# Patient Record
Sex: FEMALE | Race: WHITE | NOT HISPANIC OR LATINO | Employment: OTHER | URBAN - METROPOLITAN AREA
[De-identification: names, ages, dates, MRNs, and addresses within clinical notes are randomized per-mention and may not be internally consistent; named-entity substitution may affect disease eponyms.]

---

## 2023-02-14 PROBLEM — G47.30 APNEA, SLEEP: Status: ACTIVE | Noted: 2023-02-14

## 2023-02-14 PROBLEM — S82.409A TIBIA/FIBULA FRACTURE: Status: ACTIVE | Noted: 2023-02-14

## 2023-02-14 PROBLEM — R06.83 LOUD SNORING: Status: ACTIVE | Noted: 2023-02-14

## 2023-02-14 PROBLEM — S82.209A TIBIA/FIBULA FRACTURE: Status: ACTIVE | Noted: 2023-02-14

## 2023-02-14 PROBLEM — R51.9 HEADACHE: Status: ACTIVE | Noted: 2023-02-14

## 2023-02-14 PROBLEM — N95.1 MENOPAUSAL SYMPTOMS: Status: ACTIVE | Noted: 2023-02-14

## 2023-02-14 PROBLEM — R03.0 ELEVATED BLOOD PRESSURE READING: Status: ACTIVE | Noted: 2023-02-14

## 2023-02-14 PROBLEM — J44.9 COPD (CHRONIC OBSTRUCTIVE PULMONARY DISEASE) (MULTI): Status: ACTIVE | Noted: 2023-02-14

## 2023-02-14 PROBLEM — D21.10 BENIGN CONNECTIVE TISSUE NEOPLASM OF FINGER: Status: ACTIVE | Noted: 2023-02-14

## 2023-02-14 PROBLEM — B37.31 VAGINAL CANDIDIASIS: Status: ACTIVE | Noted: 2023-02-14

## 2023-02-14 PROBLEM — J22 LOWER RESPIRATORY TRACT INFECTION DUE TO COVID-19 VIRUS: Status: ACTIVE | Noted: 2023-02-14

## 2023-02-14 PROBLEM — T75.3XXA SEA SICKNESS: Status: ACTIVE | Noted: 2023-02-14

## 2023-02-14 PROBLEM — R11.0 NAUSEA IN ADULT: Status: ACTIVE | Noted: 2023-02-14

## 2023-02-14 PROBLEM — M79.643 HAND PAIN: Status: ACTIVE | Noted: 2023-02-14

## 2023-02-14 PROBLEM — H10.32 ACUTE BACTERIAL CONJUNCTIVITIS OF LEFT EYE: Status: ACTIVE | Noted: 2023-02-14

## 2023-02-14 PROBLEM — H44.002 INFECTION OF LEFT EYE: Status: ACTIVE | Noted: 2023-02-14

## 2023-02-14 PROBLEM — M65.341 TRIGGER RING FINGER OF RIGHT HAND: Status: ACTIVE | Noted: 2023-02-14

## 2023-02-14 PROBLEM — S00.93XA HEAD CONTUSION: Status: ACTIVE | Noted: 2023-02-14

## 2023-02-14 PROBLEM — K21.9 GERD (GASTROESOPHAGEAL REFLUX DISEASE): Status: ACTIVE | Noted: 2023-02-14

## 2023-02-14 PROBLEM — G47.00 INSOMNIA: Status: ACTIVE | Noted: 2023-02-14

## 2023-02-14 PROBLEM — R21 RASH: Status: ACTIVE | Noted: 2023-02-14

## 2023-02-14 PROBLEM — M25.539 PAIN, WRIST JOINT: Status: ACTIVE | Noted: 2023-02-14

## 2023-02-14 PROBLEM — G56.00 CARPAL TUNNEL SYNDROME: Status: ACTIVE | Noted: 2023-02-14

## 2023-02-14 PROBLEM — M79.606 LEG PAIN: Status: ACTIVE | Noted: 2023-02-14

## 2023-02-14 PROBLEM — H57.89 EYE DISCHARGE: Status: ACTIVE | Noted: 2023-02-14

## 2023-02-14 PROBLEM — R23.2 VASOMOTOR FLUSHING: Status: ACTIVE | Noted: 2023-02-14

## 2023-02-14 PROBLEM — R39.9 UTI SYMPTOMS: Status: ACTIVE | Noted: 2023-02-14

## 2023-02-14 PROBLEM — B35.1 ONYCHOMYCOSIS OF TOENAIL: Status: ACTIVE | Noted: 2023-02-14

## 2023-02-14 PROBLEM — U07.1 LOWER RESPIRATORY TRACT INFECTION DUE TO COVID-19 VIRUS: Status: ACTIVE | Noted: 2023-02-14

## 2023-02-14 PROBLEM — K62.5 RECTAL BLEED: Status: ACTIVE | Noted: 2023-02-14

## 2023-02-14 PROBLEM — L71.0 PERIORAL DERMATITIS: Status: ACTIVE | Noted: 2023-02-14

## 2023-02-14 PROBLEM — M67.432 GANGLION OF LEFT WRIST: Status: ACTIVE | Noted: 2023-02-14

## 2023-02-14 PROBLEM — J06.9 ACUTE UPPER RESPIRATORY INFECTION: Status: ACTIVE | Noted: 2023-02-14

## 2023-02-14 PROBLEM — J01.90 ACUTE SINUSITIS: Status: ACTIVE | Noted: 2023-02-14

## 2023-02-14 PROBLEM — K63.5 HYPERPLASTIC COLON POLYP: Status: ACTIVE | Noted: 2023-02-14

## 2023-02-14 PROBLEM — L73.9 FOLLICULITIS OF NOSE: Status: ACTIVE | Noted: 2023-02-14

## 2023-02-14 PROBLEM — J18.9 PNEUMONIA: Status: ACTIVE | Noted: 2023-02-14

## 2023-02-14 PROBLEM — B00.2 ORAL HERPES: Status: ACTIVE | Noted: 2023-02-14

## 2023-02-14 PROBLEM — N76.0 BACTERIAL VAGINOSIS: Status: ACTIVE | Noted: 2023-02-14

## 2023-02-14 PROBLEM — L72.3 SEBACEOUS CYST: Status: ACTIVE | Noted: 2023-02-14

## 2023-02-14 PROBLEM — B96.89 BACTERIAL VAGINOSIS: Status: ACTIVE | Noted: 2023-02-14

## 2023-02-14 PROBLEM — F41.9 ANXIETY: Status: ACTIVE | Noted: 2023-02-14

## 2023-02-14 PROBLEM — E78.5 HYPERLIPEMIA: Status: ACTIVE | Noted: 2023-02-14

## 2023-02-14 PROBLEM — M06.9 RHEUMATOID ARTHRITIS (MULTI): Status: ACTIVE | Noted: 2023-02-14

## 2023-02-14 PROBLEM — M54.2 NECK PAIN: Status: ACTIVE | Noted: 2023-02-14

## 2023-02-14 PROBLEM — L65.9 HAIR LOSS: Status: ACTIVE | Noted: 2023-02-14

## 2023-02-14 PROBLEM — R93.89 ABNORMAL CHEST CT: Status: ACTIVE | Noted: 2023-02-14

## 2023-02-14 PROBLEM — M85.80 OSTEOPENIA: Status: ACTIVE | Noted: 2023-02-14

## 2023-02-14 PROBLEM — R53.83 FATIGUE: Status: ACTIVE | Noted: 2023-02-14

## 2023-02-14 PROBLEM — M47.816 LUMBAR SPONDYLOSIS: Status: ACTIVE | Noted: 2023-02-14

## 2023-02-14 PROBLEM — M19.90 ARTHRITIS, DEGENERATIVE: Status: ACTIVE | Noted: 2023-02-14

## 2023-02-14 PROBLEM — R07.89 CHEST DISCOMFORT: Status: ACTIVE | Noted: 2023-02-14

## 2023-02-14 PROBLEM — M54.9 BACK PAIN: Status: ACTIVE | Noted: 2023-02-14

## 2023-02-14 PROBLEM — K64.4 EXTERNAL HEMORRHOIDS: Status: ACTIVE | Noted: 2023-02-14

## 2023-04-03 ENCOUNTER — APPOINTMENT (OUTPATIENT)
Dept: PRIMARY CARE | Facility: CLINIC | Age: 68
End: 2023-04-03
Payer: MEDICARE

## 2023-04-03 PROBLEM — Z04.9 CONDITION NOT FOUND: Status: ACTIVE | Noted: 2023-04-03

## 2023-04-03 RX ORDER — OMEPRAZOLE 40 MG/1
40 CAPSULE, DELAYED RELEASE ORAL DAILY
COMMUNITY
End: 2024-01-05 | Stop reason: ALTCHOICE

## 2023-05-05 ENCOUNTER — APPOINTMENT (OUTPATIENT)
Dept: PRIMARY CARE | Facility: CLINIC | Age: 68
End: 2023-05-05
Payer: MEDICARE

## 2023-05-26 ENCOUNTER — OFFICE VISIT (OUTPATIENT)
Dept: PRIMARY CARE | Facility: CLINIC | Age: 68
End: 2023-05-26
Payer: MEDICARE

## 2023-05-26 VITALS
WEIGHT: 157.4 LBS | HEART RATE: 79 BPM | HEIGHT: 67 IN | TEMPERATURE: 97.2 F | SYSTOLIC BLOOD PRESSURE: 151 MMHG | BODY MASS INDEX: 24.71 KG/M2 | DIASTOLIC BLOOD PRESSURE: 94 MMHG | OXYGEN SATURATION: 94 %

## 2023-05-26 DIAGNOSIS — I45.10 INCOMPLETE RBBB: ICD-10-CM

## 2023-05-26 DIAGNOSIS — Z12.31 BREAST CANCER SCREENING BY MAMMOGRAM: ICD-10-CM

## 2023-05-26 DIAGNOSIS — I10 PRIMARY HYPERTENSION: Primary | ICD-10-CM

## 2023-05-26 DIAGNOSIS — J42 CHRONIC BRONCHITIS, UNSPECIFIED CHRONIC BRONCHITIS TYPE (MULTI): ICD-10-CM

## 2023-05-26 DIAGNOSIS — K21.9 GASTROESOPHAGEAL REFLUX DISEASE WITHOUT ESOPHAGITIS: ICD-10-CM

## 2023-05-26 DIAGNOSIS — R03.0 ELEVATED BLOOD PRESSURE READING: ICD-10-CM

## 2023-05-26 DIAGNOSIS — E78.49 OTHER HYPERLIPIDEMIA: ICD-10-CM

## 2023-05-26 DIAGNOSIS — M06.9 RHEUMATOID ARTHRITIS, INVOLVING UNSPECIFIED SITE, UNSPECIFIED WHETHER RHEUMATOID FACTOR PRESENT (MULTI): ICD-10-CM

## 2023-05-26 DIAGNOSIS — G47.09 OTHER INSOMNIA: ICD-10-CM

## 2023-05-26 DIAGNOSIS — R93.89 ABNORMAL CHEST CT: ICD-10-CM

## 2023-05-26 PROBLEM — J43.9 EMPHYSEMA LUNG (MULTI): Status: ACTIVE | Noted: 2023-05-26

## 2023-05-26 PROBLEM — E78.00 HYPERCHOLESTEROLEMIA: Status: ACTIVE | Noted: 2022-12-19

## 2023-05-26 PROBLEM — R94.31 ABNORMAL EKG: Status: ACTIVE | Noted: 2022-12-19

## 2023-05-26 PROBLEM — R53.83 FATIGUE: Status: RESOLVED | Noted: 2023-02-14 | Resolved: 2023-05-26

## 2023-05-26 PROCEDURE — 1160F RVW MEDS BY RX/DR IN RCRD: CPT | Performed by: INTERNAL MEDICINE

## 2023-05-26 PROCEDURE — 99214 OFFICE O/P EST MOD 30 MIN: CPT | Performed by: INTERNAL MEDICINE

## 2023-05-26 PROCEDURE — 3077F SYST BP >= 140 MM HG: CPT | Performed by: INTERNAL MEDICINE

## 2023-05-26 PROCEDURE — 1036F TOBACCO NON-USER: CPT | Performed by: INTERNAL MEDICINE

## 2023-05-26 PROCEDURE — 1159F MED LIST DOCD IN RCRD: CPT | Performed by: INTERNAL MEDICINE

## 2023-05-26 PROCEDURE — 3080F DIAST BP >= 90 MM HG: CPT | Performed by: INTERNAL MEDICINE

## 2023-05-26 RX ORDER — LOSARTAN POTASSIUM 50 MG/1
50 TABLET ORAL DAILY
Qty: 30 TABLET | Refills: 2 | Status: SHIPPED | OUTPATIENT
Start: 2023-05-26 | End: 2023-06-19

## 2023-05-26 RX ORDER — TRAZODONE HYDROCHLORIDE 50 MG/1
50 TABLET ORAL NIGHTLY PRN
Qty: 30 TABLET | Refills: 2 | Status: SHIPPED | OUTPATIENT
Start: 2023-05-26 | End: 2023-06-19

## 2023-05-26 RX ORDER — ALBUTEROL SULFATE 90 UG/1
1 AEROSOL, METERED RESPIRATORY (INHALATION) EVERY 4 HOURS PRN
Qty: 18 G | Refills: 2 | Status: SHIPPED | OUTPATIENT
Start: 2023-05-26

## 2023-05-26 RX ORDER — ROSUVASTATIN CALCIUM 10 MG/1
10 TABLET, COATED ORAL DAILY
COMMUNITY
Start: 2023-03-19

## 2023-05-26 RX ORDER — ALBUTEROL SULFATE 90 UG/1
1 AEROSOL, METERED RESPIRATORY (INHALATION) EVERY 4 HOURS PRN
COMMUNITY
End: 2023-05-26 | Stop reason: SDUPTHER

## 2023-05-26 ASSESSMENT — PATIENT HEALTH QUESTIONNAIRE - PHQ9
2. FEELING DOWN, DEPRESSED OR HOPELESS: NOT AT ALL
1. LITTLE INTEREST OR PLEASURE IN DOING THINGS: NOT AT ALL
SUM OF ALL RESPONSES TO PHQ9 QUESTIONS 1 AND 2: 0

## 2023-05-26 ASSESSMENT — ENCOUNTER SYMPTOMS
PSYCHIATRIC NEGATIVE: 1
EYES NEGATIVE: 1
HEMATOLOGIC/LYMPHATIC NEGATIVE: 1
RESPIRATORY NEGATIVE: 1
NEUROLOGICAL NEGATIVE: 1
GASTROINTESTINAL NEGATIVE: 1
CONSTITUTIONAL NEGATIVE: 1
CARDIOVASCULAR NEGATIVE: 1

## 2023-05-26 NOTE — PATIENT INSTRUCTIONS
Start Losartan.  Cut back on caffeine intake.  Return as nurse visit for Prevnar 20.  Follow up in 4-6 weeks.  Renew all meds.

## 2023-05-26 NOTE — ASSESSMENT & PLAN NOTE
Start Losartan,monitor home BP and bring BP log and BP monitor for validation at next visit in 4-6 weeks.follow strict low sodium diet and avoid caffeine.

## 2023-05-26 NOTE — PROGRESS NOTES
"Subjective   Patient ID: Yu Oneil is a 67 y.o. female who presents for 3 month follow up.     f/u on HLD,COPD,insomnia,tolerating current meds well,she feels well and denies any complaints except for her hands pain from her RA, they had to cut her ring due to her finger swelling.has infrequent brief dry cough.  she continue to see Dr Awan for her RA.  Follow up on HTN,she drinks 4 cups of coffee,her home BP has been up.  No CP,SOB.  She started walking and swimming to make her legs muscle stronger.  she rarely needs to use Trazadone for insomnia,but needs a refill.  she was a former smoker 1 PPD or more for over 30 years,quit in 2019,last CT chest low dose done 12/22.            Review of Systems   Constitutional: Negative.    HENT: Negative.     Eyes: Negative.    Respiratory: Negative.          As HPI.   Cardiovascular: Negative.    Gastrointestinal: Negative.    Genitourinary: Negative.    Musculoskeletal:         Has RA and hands pain.   Neurological: Negative.    Hematological: Negative.    Psychiatric/Behavioral: Negative.         Objective   BP (!) 151/94 (BP Location: Right arm, Patient Position: Sitting, BP Cuff Size: Adult)   Pulse 79   Temp 36.2 °C (97.2 °F) (Temporal)   Ht 1.702 m (5' 7\")   Wt 71.4 kg (157 lb 6.4 oz)   SpO2 94%   BMI 24.65 kg/m²     Physical Exam  Constitutional:       Appearance: Normal appearance.   HENT:      Head: Normocephalic and atraumatic.   Eyes:      Extraocular Movements: Extraocular movements intact.      Pupils: Pupils are equal, round, and reactive to light.   Cardiovascular:      Rate and Rhythm: Normal rate and regular rhythm.      Heart sounds: Normal heart sounds.   Pulmonary:      Effort: Pulmonary effort is normal.      Breath sounds: Normal breath sounds. No wheezing or rhonchi.   Abdominal:      General: Abdomen is flat. Bowel sounds are normal. There is no distension.      Palpations: Abdomen is soft.   Musculoskeletal:      Cervical back: Normal " range of motion and neck supple.      Left lower leg: No edema.      Comments: Hands RA changes.   Skin:     General: Skin is warm.   Neurological:      General: No focal deficit present.      Mental Status: She is alert and oriented to person, place, and time.   Psychiatric:         Mood and Affect: Mood normal.         Behavior: Behavior normal.         Assessment/Plan   Problem List Items Addressed This Visit          Nervous    Insomnia    Relevant Medications    traZODone (Desyrel) 50 mg tablet       Respiratory    COPD (chronic obstructive pulmonary disease) (CMS/HCC)    Relevant Medications    albuterol 90 mcg/actuation inhaler       Circulatory    Elevated blood pressure reading     Start Losartan,monitor home BP and bring BP log and BP monitor for validation at next visit in 4-6 weeks.follow strict low sodium diet and avoid caffeine.         Incomplete RBBB    Primary hypertension - Primary    Relevant Medications    losartan (Cozaar) 50 mg tablet       Digestive    GERD (gastroesophageal reflux disease)       Other    Abnormal chest CT    Hyperlipemia     Stable on crestor,order fasting lipid.         Relevant Orders    Lipid panel    Hepatic Function Panel    Rheumatoid arthritis (CMS/[facility])     Other Visit Diagnoses       Breast cancer screening by mammogram        Relevant Orders    BI mammo bilateral screening tomosynthesis

## 2023-06-02 ENCOUNTER — APPOINTMENT (OUTPATIENT)
Dept: PRIMARY CARE | Facility: CLINIC | Age: 68
End: 2023-06-02
Payer: MEDICARE

## 2023-06-05 ENCOUNTER — APPOINTMENT (OUTPATIENT)
Dept: PRIMARY CARE | Facility: CLINIC | Age: 68
End: 2023-06-05
Payer: MEDICARE

## 2023-06-07 ENCOUNTER — CLINICAL SUPPORT (OUTPATIENT)
Dept: PRIMARY CARE | Facility: CLINIC | Age: 68
End: 2023-06-07
Payer: MEDICARE

## 2023-06-07 ENCOUNTER — LAB (OUTPATIENT)
Dept: LAB | Facility: LAB | Age: 68
End: 2023-06-07
Payer: MEDICARE

## 2023-06-07 DIAGNOSIS — Z00.00 HEALTHCARE MAINTENANCE: ICD-10-CM

## 2023-06-07 DIAGNOSIS — K21.9 GASTROESOPHAGEAL REFLUX DISEASE, UNSPECIFIED WHETHER ESOPHAGITIS PRESENT: ICD-10-CM

## 2023-06-07 DIAGNOSIS — E78.49 OTHER HYPERLIPIDEMIA: ICD-10-CM

## 2023-06-07 LAB
ALANINE AMINOTRANSFERASE (SGPT) (U/L) IN SER/PLAS: 23 U/L (ref 7–45)
ALBUMIN (G/DL) IN SER/PLAS: 4.1 G/DL (ref 3.4–5)
ALKALINE PHOSPHATASE (U/L) IN SER/PLAS: 73 U/L (ref 33–136)
ASPARTATE AMINOTRANSFERASE (SGOT) (U/L) IN SER/PLAS: 26 U/L (ref 9–39)
BILIRUBIN DIRECT (MG/DL) IN SER/PLAS: 0.2 MG/DL (ref 0–0.3)
BILIRUBIN TOTAL (MG/DL) IN SER/PLAS: 0.8 MG/DL (ref 0–1.2)
CHOLESTEROL (MG/DL) IN SER/PLAS: 214 MG/DL (ref 0–199)
CHOLESTEROL IN HDL (MG/DL) IN SER/PLAS: 95.2 MG/DL
CHOLESTEROL/HDL RATIO: 2.2
LDL: 109 MG/DL (ref 0–99)
PROTEIN TOTAL: 7.7 G/DL (ref 6.4–8.2)
TRIGLYCERIDE (MG/DL) IN SER/PLAS: 47 MG/DL (ref 0–149)
VLDL: 9 MG/DL (ref 0–40)

## 2023-06-07 PROCEDURE — 80076 HEPATIC FUNCTION PANEL: CPT

## 2023-06-07 PROCEDURE — 80061 LIPID PANEL: CPT

## 2023-06-07 PROCEDURE — G0009 ADMIN PNEUMOCOCCAL VACCINE: HCPCS | Performed by: INTERNAL MEDICINE

## 2023-06-07 PROCEDURE — 36415 COLL VENOUS BLD VENIPUNCTURE: CPT

## 2023-06-07 PROCEDURE — 90677 PCV20 VACCINE IM: CPT | Performed by: INTERNAL MEDICINE

## 2023-06-07 RX ORDER — FAMOTIDINE 20 MG/1
20 TABLET, FILM COATED ORAL 2 TIMES DAILY PRN
Qty: 180 TABLET | Refills: 3 | Status: SHIPPED | OUTPATIENT
Start: 2023-06-07

## 2023-06-07 RX ORDER — FAMOTIDINE 20 MG/1
20 TABLET, FILM COATED ORAL 2 TIMES DAILY PRN
COMMUNITY
End: 2023-06-07 | Stop reason: SDUPTHER

## 2023-06-09 PROBLEM — Z00.00 HEALTHCARE MAINTENANCE: Status: ACTIVE | Noted: 2023-04-03

## 2023-06-17 DIAGNOSIS — I10 PRIMARY HYPERTENSION: ICD-10-CM

## 2023-06-17 DIAGNOSIS — G47.09 OTHER INSOMNIA: ICD-10-CM

## 2023-06-19 RX ORDER — TRAZODONE HYDROCHLORIDE 50 MG/1
50 TABLET ORAL NIGHTLY PRN
Qty: 30 TABLET | Refills: 2 | Status: SHIPPED | OUTPATIENT
Start: 2023-06-19 | End: 2023-09-19

## 2023-06-19 RX ORDER — LOSARTAN POTASSIUM 50 MG/1
TABLET ORAL
Qty: 30 TABLET | Refills: 2 | Status: SHIPPED | OUTPATIENT
Start: 2023-06-19 | End: 2023-07-03

## 2023-06-26 ENCOUNTER — APPOINTMENT (OUTPATIENT)
Dept: PRIMARY CARE | Facility: CLINIC | Age: 68
End: 2023-06-26
Payer: MEDICARE

## 2023-07-03 DIAGNOSIS — I10 PRIMARY HYPERTENSION: ICD-10-CM

## 2023-07-03 RX ORDER — LOSARTAN POTASSIUM 50 MG/1
TABLET ORAL
Qty: 90 TABLET | Refills: 3 | Status: SHIPPED | OUTPATIENT
Start: 2023-07-03 | End: 2024-01-05

## 2023-07-06 PROBLEM — R53.83 FATIGUE: Status: ACTIVE | Noted: 2023-07-06

## 2023-07-06 PROBLEM — R07.89 CHEST TIGHTNESS: Status: ACTIVE | Noted: 2023-07-06

## 2023-07-06 PROBLEM — E78.5 HYPERLIPIDEMIA: Status: ACTIVE | Noted: 2023-07-06

## 2023-07-06 PROBLEM — M54.2 CERVICAL PAIN: Status: ACTIVE | Noted: 2023-07-06

## 2023-07-06 PROBLEM — S39.012A LUMBAR STRAIN: Status: ACTIVE | Noted: 2023-07-06

## 2023-07-06 PROBLEM — I10 ESSENTIAL HYPERTENSION, BENIGN: Status: ACTIVE | Noted: 2023-07-06

## 2023-07-07 ENCOUNTER — APPOINTMENT (OUTPATIENT)
Dept: PRIMARY CARE | Facility: CLINIC | Age: 68
End: 2023-07-07
Payer: MEDICARE

## 2023-07-14 ENCOUNTER — APPOINTMENT (OUTPATIENT)
Dept: PRIMARY CARE | Facility: CLINIC | Age: 68
End: 2023-07-14
Payer: MEDICARE

## 2023-08-21 ENCOUNTER — OFFICE VISIT (OUTPATIENT)
Dept: PRIMARY CARE | Facility: CLINIC | Age: 68
End: 2023-08-21
Payer: MEDICARE

## 2023-08-21 VITALS
HEIGHT: 67 IN | HEART RATE: 82 BPM | BODY MASS INDEX: 24.61 KG/M2 | WEIGHT: 156.8 LBS | SYSTOLIC BLOOD PRESSURE: 135 MMHG | TEMPERATURE: 97 F | DIASTOLIC BLOOD PRESSURE: 83 MMHG | OXYGEN SATURATION: 96 %

## 2023-08-21 DIAGNOSIS — J20.9 ACUTE BRONCHITIS, UNSPECIFIED ORGANISM: Primary | ICD-10-CM

## 2023-08-21 DIAGNOSIS — J45.909 REACTIVE AIRWAY DISEASE WITH WHEEZING WITHOUT COMPLICATION, UNSPECIFIED ASTHMA SEVERITY, UNSPECIFIED WHETHER PERSISTENT (HHS-HCC): ICD-10-CM

## 2023-08-21 PROCEDURE — 1160F RVW MEDS BY RX/DR IN RCRD: CPT | Performed by: STUDENT IN AN ORGANIZED HEALTH CARE EDUCATION/TRAINING PROGRAM

## 2023-08-21 PROCEDURE — 99213 OFFICE O/P EST LOW 20 MIN: CPT | Performed by: STUDENT IN AN ORGANIZED HEALTH CARE EDUCATION/TRAINING PROGRAM

## 2023-08-21 PROCEDURE — 3075F SYST BP GE 130 - 139MM HG: CPT | Performed by: STUDENT IN AN ORGANIZED HEALTH CARE EDUCATION/TRAINING PROGRAM

## 2023-08-21 PROCEDURE — 3079F DIAST BP 80-89 MM HG: CPT | Performed by: STUDENT IN AN ORGANIZED HEALTH CARE EDUCATION/TRAINING PROGRAM

## 2023-08-21 PROCEDURE — 1126F AMNT PAIN NOTED NONE PRSNT: CPT | Performed by: STUDENT IN AN ORGANIZED HEALTH CARE EDUCATION/TRAINING PROGRAM

## 2023-08-21 PROCEDURE — 1159F MED LIST DOCD IN RCRD: CPT | Performed by: STUDENT IN AN ORGANIZED HEALTH CARE EDUCATION/TRAINING PROGRAM

## 2023-08-21 PROCEDURE — 1036F TOBACCO NON-USER: CPT | Performed by: STUDENT IN AN ORGANIZED HEALTH CARE EDUCATION/TRAINING PROGRAM

## 2023-08-21 RX ORDER — PREDNISONE 20 MG/1
40 TABLET ORAL DAILY
Qty: 10 TABLET | Refills: 0 | Status: SHIPPED | OUTPATIENT
Start: 2023-08-21 | End: 2023-08-26

## 2023-08-21 ASSESSMENT — ENCOUNTER SYMPTOMS
TROUBLE SWALLOWING: 1
FATIGUE: 1
COUGH: 1
FEVER: 0
LIGHT-HEADEDNESS: 1
SORE THROAT: 1
CHEST TIGHTNESS: 1
CHILLS: 0
SHORTNESS OF BREATH: 1
DIAPHORESIS: 1
ABDOMINAL PAIN: 0
SINUS PAIN: 0
NAUSEA: 0
RHINORRHEA: 1
SINUS PRESSURE: 0
VOMITING: 1

## 2023-08-21 ASSESSMENT — PATIENT HEALTH QUESTIONNAIRE - PHQ9
2. FEELING DOWN, DEPRESSED OR HOPELESS: NOT AT ALL
SUM OF ALL RESPONSES TO PHQ9 QUESTIONS 1 AND 2: 0
1. LITTLE INTEREST OR PLEASURE IN DOING THINGS: NOT AT ALL

## 2023-08-21 NOTE — PROGRESS NOTES
"Subjective   Patient ID: Yu Oneil is a 67 y.o. female who presents for Cough (COVID NEGATIVE).  She is here for URI. Did 2 covid tests at home and were negative. Hasn't been able to sleep over last 3 nights to help with mucus drainage. Coughing so hard feels she has to vomit at times. 4 days ago she began with a sore throat, developed into cough, fatigue. No fevers. Tried zycam, mucinex spray (benzocaine and menthol).         Review of Systems   Constitutional:  Positive for diaphoresis and fatigue. Negative for chills and fever.   HENT:  Positive for postnasal drip, rhinorrhea, sore throat and trouble swallowing (improving, able to keep food and liquids down.). Negative for ear discharge, ear pain, sinus pressure and sinus pain.    Respiratory:  Positive for cough, chest tightness (with coughing) and shortness of breath (intermittent and a/w SOB).    Cardiovascular:  Negative for chest pain.   Gastrointestinal:  Positive for vomiting (x1 episode a/w vomiting). Negative for abdominal pain and nausea.   Neurological:  Positive for light-headedness (if coughes a lot will get this).       /83   Pulse 82   Temp 36.1 °C (97 °F)   Ht 1.702 m (5' 7\")   Wt 71.1 kg (156 lb 12.8 oz)   SpO2 96%   BMI 24.56 kg/m²     Objective   Physical Exam  Vitals reviewed.   HENT:      Head: Normocephalic.      Right Ear: Tympanic membrane, ear canal and external ear normal. There is no impacted cerumen.      Left Ear: Tympanic membrane, ear canal and external ear normal. There is no impacted cerumen.      Mouth/Throat:      Mouth: Mucous membranes are moist.      Pharynx: Oropharynx is clear. No oropharyngeal exudate or posterior oropharyngeal erythema.   Cardiovascular:      Rate and Rhythm: Normal rate and regular rhythm.   Pulmonary:      Effort: Pulmonary effort is normal. No respiratory distress.      Breath sounds: Wheezing (Diffuse, more prominent in upper lobes) present. No rhonchi or rales.   Abdominal:      " General: There is no distension.   Musculoskeletal:         General: No deformity.      Cervical back: Neck supple. No tenderness.   Lymphadenopathy:      Cervical: No cervical adenopathy.   Skin:     Coloration: Skin is not jaundiced.   Neurological:      Mental Status: She is alert.   Psychiatric:         Mood and Affect: Mood normal.         Behavior: Behavior normal.         Assessment/Plan   Problem List Items Addressed This Visit       Reactive airway disease with wheezing without complication     Other Visit Diagnoses       Acute bronchitis, unspecified organism    -  Primary    Relevant Medications    predniSONE (Deltasone) 20 mg tablet        Acute viral bronchitis  Reactive airway disease  - Symptoms seem consistent with viral bronchitis/reactive airway disease.  -Trial prednisone burst and albuterol every 4 hours 1 to 2 puffs as needed for shortness of breath.  Advised take prednisone early in the morning with food to avoid insomnia.  -She will use mucinex spray PRN for sore throat. Also consider salt water gargles.   -Monitor pulse ox at home and ER for pulse ox less than 90%..  Fluids and rest.  -She will let us know how she is doing over the next few days.  Low threshold for x-ray/antibiotics.  ER for worsening symptoms.    Follow-up with me as needed  Follow-up with Dr. Bowens as previously scheduled.

## 2023-08-21 NOTE — LETTER
August 21, 2023     Patient: Yu Oneil   YOB: 1955   Date of Visit: 8/21/2023       To Whom It May Concern:    Yu Oneil was seen in my clinic on 8/21/2023 at 2:00 pm. She is recovering from a viral upper respiratory illness and needs to recover as she is high risk of going to the emergency room. She should not be around young children while she recovers over this week. I do not recommend she babysit her grandchild Camilo Oneil.     If you have any questions or concerns, please don't hesitate to call.         Sincerely,         Roberto Andres, DO        CC: No Recipients

## 2023-09-17 DIAGNOSIS — G47.09 OTHER INSOMNIA: ICD-10-CM

## 2023-09-19 RX ORDER — TRAZODONE HYDROCHLORIDE 50 MG/1
50 TABLET ORAL NIGHTLY PRN
Qty: 90 TABLET | Refills: 0 | Status: SHIPPED | OUTPATIENT
Start: 2023-09-19

## 2023-10-19 ENCOUNTER — SPECIALTY PHARMACY (OUTPATIENT)
Dept: PHARMACY | Facility: CLINIC | Age: 68
End: 2023-10-19

## 2023-10-19 ENCOUNTER — PHARMACY VISIT (OUTPATIENT)
Dept: PHARMACY | Facility: CLINIC | Age: 68
End: 2023-10-19
Payer: COMMERCIAL

## 2023-10-19 PROCEDURE — RXMED WILLOW AMBULATORY MEDICATION CHARGE

## 2023-11-08 ENCOUNTER — OFFICE VISIT (OUTPATIENT)
Dept: PRIMARY CARE | Facility: CLINIC | Age: 68
End: 2023-11-08
Payer: MEDICARE

## 2023-11-08 VITALS
WEIGHT: 161 LBS | DIASTOLIC BLOOD PRESSURE: 80 MMHG | SYSTOLIC BLOOD PRESSURE: 138 MMHG | TEMPERATURE: 97.2 F | HEART RATE: 60 BPM | BODY MASS INDEX: 25.27 KG/M2 | OXYGEN SATURATION: 98 % | HEIGHT: 67 IN

## 2023-11-08 DIAGNOSIS — I70.0 ATHEROSCLEROSIS OF AORTA (CMS-HCC): ICD-10-CM

## 2023-11-08 DIAGNOSIS — G89.29 CHRONIC PAIN OF BOTH FEET: ICD-10-CM

## 2023-11-08 DIAGNOSIS — Z12.31 VISIT FOR SCREENING MAMMOGRAM: ICD-10-CM

## 2023-11-08 DIAGNOSIS — I10 ESSENTIAL HYPERTENSION, BENIGN: ICD-10-CM

## 2023-11-08 DIAGNOSIS — M79.671 CHRONIC PAIN OF BOTH FEET: ICD-10-CM

## 2023-11-08 DIAGNOSIS — M79.672 CHRONIC PAIN OF BOTH FEET: ICD-10-CM

## 2023-11-08 DIAGNOSIS — E27.8 OTHER SPECIFIED DISORDERS OF ADRENAL GLAND (MULTI): ICD-10-CM

## 2023-11-08 DIAGNOSIS — R25.2 CRAMP IN LOWER LEG: ICD-10-CM

## 2023-11-08 DIAGNOSIS — B35.1 ONYCHOMYCOSIS: Primary | ICD-10-CM

## 2023-11-08 DIAGNOSIS — Z00.00 HEALTHCARE MAINTENANCE: ICD-10-CM

## 2023-11-08 DIAGNOSIS — E78.00 HYPERCHOLESTEROLEMIA: ICD-10-CM

## 2023-11-08 PROBLEM — E78.5 HYPERLIPEMIA: Status: RESOLVED | Noted: 2023-02-14 | Resolved: 2023-11-08

## 2023-11-08 PROBLEM — E78.5 HYPERLIPIDEMIA: Status: RESOLVED | Noted: 2023-07-06 | Resolved: 2023-11-08

## 2023-11-08 PROCEDURE — 1160F RVW MEDS BY RX/DR IN RCRD: CPT | Performed by: INTERNAL MEDICINE

## 2023-11-08 PROCEDURE — 3075F SYST BP GE 130 - 139MM HG: CPT | Performed by: INTERNAL MEDICINE

## 2023-11-08 PROCEDURE — 1159F MED LIST DOCD IN RCRD: CPT | Performed by: INTERNAL MEDICINE

## 2023-11-08 PROCEDURE — 90662 IIV NO PRSV INCREASED AG IM: CPT | Performed by: INTERNAL MEDICINE

## 2023-11-08 PROCEDURE — G0008 ADMIN INFLUENZA VIRUS VAC: HCPCS | Performed by: INTERNAL MEDICINE

## 2023-11-08 PROCEDURE — 1036F TOBACCO NON-USER: CPT | Performed by: INTERNAL MEDICINE

## 2023-11-08 PROCEDURE — 99214 OFFICE O/P EST MOD 30 MIN: CPT | Performed by: INTERNAL MEDICINE

## 2023-11-08 PROCEDURE — 3078F DIAST BP <80 MM HG: CPT | Performed by: INTERNAL MEDICINE

## 2023-11-08 PROCEDURE — 1126F AMNT PAIN NOTED NONE PRSNT: CPT | Performed by: INTERNAL MEDICINE

## 2023-11-08 RX ORDER — TERBINAFINE HYDROCHLORIDE 250 MG/1
250 TABLET ORAL 2 TIMES DAILY
Qty: 180 TABLET | Refills: 0 | Status: SHIPPED | OUTPATIENT
Start: 2023-11-08 | End: 2024-02-06

## 2023-11-08 RX ORDER — METHOCARBAMOL 750 MG/1
TABLET, FILM COATED ORAL
COMMUNITY
Start: 2023-08-28

## 2023-11-08 ASSESSMENT — ENCOUNTER SYMPTOMS
NEUROLOGICAL NEGATIVE: 1
EYES NEGATIVE: 1
GASTROINTESTINAL NEGATIVE: 1
PSYCHIATRIC NEGATIVE: 1
CARDIOVASCULAR NEGATIVE: 1
HEMATOLOGIC/LYMPHATIC NEGATIVE: 1
CONSTITUTIONAL NEGATIVE: 1

## 2023-11-08 ASSESSMENT — PATIENT HEALTH QUESTIONNAIRE - PHQ9
1. LITTLE INTEREST OR PLEASURE IN DOING THINGS: NOT AT ALL
2. FEELING DOWN, DEPRESSED OR HOPELESS: NOT AT ALL
SUM OF ALL RESPONSES TO PHQ9 QUESTIONS 1 AND 2: 0

## 2023-11-08 NOTE — ASSESSMENT & PLAN NOTE
Has been taking half losartan advised her to take a full 1 monitor her BP and return back for Medicare in January.  Bring home BP log with you at next visit.  Flu vaccine given to patient today.  I advised patient to schedule her mammogram.  Advised patient to get COVID booster and RSV vaccine at the pharmacy.

## 2023-11-08 NOTE — ASSESSMENT & PLAN NOTE
Order feet x-ray.  Refer to podiatrist, this also could be caused by her high arched, also could be radicular from her back.

## 2023-11-08 NOTE — ASSESSMENT & PLAN NOTE
We will treat with Lamisil for 3 months she had normal baseline liver test from December 2022 but will repeat CMP prior to starting Lamisil, we will also refer to a podiatry.

## 2023-11-08 NOTE — PROGRESS NOTES
"Subjective   Patient ID: Yu Oneil is a 68 y.o. female who presents for Feet pain/needle sensation  and Infected nail on left foot .    This is a 68-year-old patient who is here for problem with her feet and left great toenail.  She has been having bilateral feet pain specially when she is on her feet get better when she is lying down pain located at the distal plantar aspect of both feet.  She also had thickened mycotic left great toenail for years, in 21 she was treated with Lamisil with good response but then problem reoccurred.  She did hit her left great toe about a year ago and was turning black after the trauma, no real pain but the toenail changes worsened since then.  It is hard for her to cut her toenails.  She also noted cramping of her toes bilaterally, very painful, she tried to stretch as much as possible, she drink enough water.  She did not go for her mammogram yet.    f/u on HLD, hypertension.  she continue to see Dr Awan for her RA.  she rarely needs to use Trazadone for insomnia.  she was a former smoker 1 PPD or more for over 30 years,quit in 2019,last CT chest low dose done 12/17/21.            Review of Systems   Constitutional: Negative.    HENT: Negative.     Eyes: Negative.    Respiratory:          Has occasional dry cough.   Cardiovascular: Negative.    Gastrointestinal: Negative.    Genitourinary: Negative.    Musculoskeletal:         As HPI.   Neurological: Negative.    Hematological: Negative.    Psychiatric/Behavioral: Negative.         Objective   /80 (BP Location: Left arm, Patient Position: Sitting)   Pulse 60   Temp 36.2 °C (97.2 °F) (Temporal)   Ht 1.702 m (5' 7\")   Wt 73 kg (161 lb)   SpO2 98%   BMI 25.22 kg/m²     Physical Exam  Constitutional:       Appearance: Normal appearance.   HENT:      Head: Normocephalic and atraumatic.   Eyes:      Extraocular Movements: Extraocular movements intact.      Pupils: Pupils are equal, round, and reactive to light. "   Cardiovascular:      Rate and Rhythm: Normal rate and regular rhythm.      Heart sounds: Normal heart sounds.   Pulmonary:      Effort: Pulmonary effort is normal.      Breath sounds: Normal breath sounds. No wheezing or rhonchi.   Abdominal:      General: Abdomen is flat. There is no distension.   Musculoskeletal:      Cervical back: Normal range of motion and neck supple.      Right lower leg: No edema.      Left lower leg: No edema.      Comments: Feet noted high arch bilaterally.  Pain with palpation over the plantar aspect of her metatarsal joint on both feet.  Left great toenail mycotic, thickened yellowish, no pain when pressing on her toe or around her toenail,  She has good pedal pulse.   Skin:     General: Skin is warm.   Neurological:      General: No focal deficit present.      Mental Status: She is alert and oriented to person, place, and time.   Psychiatric:         Mood and Affect: Mood normal.         Behavior: Behavior normal.         Assessment/Plan   Problem List Items Addressed This Visit             ICD-10-CM    Onychomycosis - Primary B35.1     We will treat with Lamisil for 3 months she had normal baseline liver test from December 2022 but will repeat CMP prior to starting Lamisil, we will also refer to a podiatry.         Relevant Medications    terbinafine (LamISIL) 250 mg tablet    Other Relevant Orders    Referral to Podiatry    Healthcare maintenance Z00.00    Relevant Orders    Flu vaccine, quadrivalent, high-dose, preservative free, age 65y+ (FLUZONE) (Completed)    Hypercholesterolemia E78.00     Continue Crestor and low-fat diet.         Essential hypertension, benign I10     Has been taking half losartan advised her to take a full 1 monitor her BP and return back for Medicare in January.  Bring home BP log with you at next visit.  Flu vaccine given to patient today.  I advised patient to schedule her mammogram.  Advised patient to get COVID booster and RSV vaccine at the  pharmacy.         Chronic pain of both feet M79.671, G89.29, M79.672     Order feet x-ray.  Refer to podiatrist, this also could be caused by her high arched, also could be radicular from her back.         Relevant Orders    Referral to Podiatry    XR foot right 3+ views    XR foot left 3+ views    Cramp in lower leg R25.2     Check electrolytes and magnesium.  Stretching exercise.  Continue muscle relaxant.         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

## 2023-11-15 ENCOUNTER — PHARMACY VISIT (OUTPATIENT)
Dept: PHARMACY | Facility: CLINIC | Age: 68
End: 2023-11-15
Payer: COMMERCIAL

## 2023-11-15 PROCEDURE — RXMED WILLOW AMBULATORY MEDICATION CHARGE

## 2023-12-13 PROCEDURE — RXMED WILLOW AMBULATORY MEDICATION CHARGE

## 2023-12-18 ENCOUNTER — PHARMACY VISIT (OUTPATIENT)
Dept: PHARMACY | Facility: CLINIC | Age: 68
End: 2023-12-18
Payer: COMMERCIAL

## 2024-01-05 ENCOUNTER — OFFICE VISIT (OUTPATIENT)
Dept: PRIMARY CARE | Facility: CLINIC | Age: 69
End: 2024-01-05
Payer: MEDICARE

## 2024-01-05 VITALS
SYSTOLIC BLOOD PRESSURE: 147 MMHG | BODY MASS INDEX: 24.86 KG/M2 | DIASTOLIC BLOOD PRESSURE: 76 MMHG | HEIGHT: 67 IN | WEIGHT: 158.4 LBS | HEART RATE: 65 BPM | OXYGEN SATURATION: 98 %

## 2024-01-05 DIAGNOSIS — T75.3XXA SEA SICKNESS, INITIAL ENCOUNTER: ICD-10-CM

## 2024-01-05 DIAGNOSIS — E27.8 OTHER SPECIFIED DISORDERS OF ADRENAL GLAND (MULTI): ICD-10-CM

## 2024-01-05 DIAGNOSIS — Z11.3 SCREEN FOR STD (SEXUALLY TRANSMITTED DISEASE): ICD-10-CM

## 2024-01-05 DIAGNOSIS — Z12.31 VISIT FOR SCREENING MAMMOGRAM: ICD-10-CM

## 2024-01-05 DIAGNOSIS — I10 PRIMARY HYPERTENSION: ICD-10-CM

## 2024-01-05 DIAGNOSIS — Z12.2 ENCOUNTER FOR SCREENING FOR MALIGNANT NEOPLASM OF LUNG IN FORMER SMOKER WHO QUIT IN PAST 15 YEARS WITH 30 PACK YEAR HISTORY OR GREATER: ICD-10-CM

## 2024-01-05 DIAGNOSIS — K63.5 HYPERPLASTIC COLONIC POLYP, UNSPECIFIED PART OF COLON: ICD-10-CM

## 2024-01-05 DIAGNOSIS — Z00.00 MEDICARE ANNUAL WELLNESS VISIT, SUBSEQUENT: Primary | ICD-10-CM

## 2024-01-05 DIAGNOSIS — M85.80 OSTEOPENIA, UNSPECIFIED LOCATION: ICD-10-CM

## 2024-01-05 DIAGNOSIS — N89.8 VAGINA ITCHING: ICD-10-CM

## 2024-01-05 DIAGNOSIS — K21.9 GASTROESOPHAGEAL REFLUX DISEASE WITHOUT ESOPHAGITIS: ICD-10-CM

## 2024-01-05 DIAGNOSIS — M06.9 RHEUMATOID ARTHRITIS, INVOLVING UNSPECIFIED SITE, UNSPECIFIED WHETHER RHEUMATOID FACTOR PRESENT (MULTI): ICD-10-CM

## 2024-01-05 DIAGNOSIS — I45.10 INCOMPLETE RBBB: ICD-10-CM

## 2024-01-05 DIAGNOSIS — I70.0 ATHEROSCLEROSIS OF AORTA (CMS-HCC): ICD-10-CM

## 2024-01-05 DIAGNOSIS — E78.49 OTHER HYPERLIPIDEMIA: ICD-10-CM

## 2024-01-05 DIAGNOSIS — J42 CHRONIC BRONCHITIS, UNSPECIFIED CHRONIC BRONCHITIS TYPE (MULTI): ICD-10-CM

## 2024-01-05 DIAGNOSIS — Z87.891 ENCOUNTER FOR SCREENING FOR MALIGNANT NEOPLASM OF LUNG IN FORMER SMOKER WHO QUIT IN PAST 15 YEARS WITH 30 PACK YEAR HISTORY OR GREATER: ICD-10-CM

## 2024-01-05 DIAGNOSIS — I10 ESSENTIAL HYPERTENSION, BENIGN: ICD-10-CM

## 2024-01-05 DIAGNOSIS — Z78.0 ASYMPTOMATIC MENOPAUSE: ICD-10-CM

## 2024-01-05 DIAGNOSIS — B37.31 VAGINAL CANDIDIASIS: ICD-10-CM

## 2024-01-05 DIAGNOSIS — J43.9 PULMONARY EMPHYSEMA, UNSPECIFIED EMPHYSEMA TYPE (MULTI): ICD-10-CM

## 2024-01-05 DIAGNOSIS — Z00.00 ROUTINE GENERAL MEDICAL EXAMINATION AT HEALTH CARE FACILITY: ICD-10-CM

## 2024-01-05 PROBLEM — R07.89 CHEST TIGHTNESS: Status: RESOLVED | Noted: 2023-07-06 | Resolved: 2024-01-05

## 2024-01-05 PROBLEM — J06.9 ACUTE UPPER RESPIRATORY INFECTION: Status: RESOLVED | Noted: 2023-02-14 | Resolved: 2024-01-05

## 2024-01-05 PROBLEM — H57.89 EYE DISCHARGE: Status: RESOLVED | Noted: 2023-02-14 | Resolved: 2024-01-05

## 2024-01-05 PROBLEM — J01.90 ACUTE SINUSITIS: Status: RESOLVED | Noted: 2023-02-14 | Resolved: 2024-01-05

## 2024-01-05 PROBLEM — R07.89 CHEST DISCOMFORT: Status: RESOLVED | Noted: 2023-02-14 | Resolved: 2024-01-05

## 2024-01-05 PROBLEM — L73.9 FOLLICULITIS OF NOSE: Status: RESOLVED | Noted: 2023-02-14 | Resolved: 2024-01-05

## 2024-01-05 PROBLEM — H10.32 ACUTE BACTERIAL CONJUNCTIVITIS OF LEFT EYE: Status: RESOLVED | Noted: 2023-02-14 | Resolved: 2024-01-05

## 2024-01-05 PROBLEM — R39.9 UTI SYMPTOMS: Status: RESOLVED | Noted: 2023-02-14 | Resolved: 2024-01-05

## 2024-01-05 PROCEDURE — 1159F MED LIST DOCD IN RCRD: CPT | Performed by: INTERNAL MEDICINE

## 2024-01-05 PROCEDURE — 3078F DIAST BP <80 MM HG: CPT | Performed by: INTERNAL MEDICINE

## 2024-01-05 PROCEDURE — G0444 DEPRESSION SCREEN ANNUAL: HCPCS | Performed by: INTERNAL MEDICINE

## 2024-01-05 PROCEDURE — 99215 OFFICE O/P EST HI 40 MIN: CPT | Performed by: INTERNAL MEDICINE

## 2024-01-05 PROCEDURE — 3077F SYST BP >= 140 MM HG: CPT | Performed by: INTERNAL MEDICINE

## 2024-01-05 PROCEDURE — 1170F FXNL STATUS ASSESSED: CPT | Performed by: INTERNAL MEDICINE

## 2024-01-05 PROCEDURE — 1126F AMNT PAIN NOTED NONE PRSNT: CPT | Performed by: INTERNAL MEDICINE

## 2024-01-05 PROCEDURE — 1160F RVW MEDS BY RX/DR IN RCRD: CPT | Performed by: INTERNAL MEDICINE

## 2024-01-05 PROCEDURE — 93000 ELECTROCARDIOGRAM COMPLETE: CPT | Performed by: INTERNAL MEDICINE

## 2024-01-05 PROCEDURE — 87205 SMEAR GRAM STAIN: CPT

## 2024-01-05 PROCEDURE — 1036F TOBACCO NON-USER: CPT | Performed by: INTERNAL MEDICINE

## 2024-01-05 PROCEDURE — G0442 ANNUAL ALCOHOL SCREEN 15 MIN: HCPCS | Performed by: INTERNAL MEDICINE

## 2024-01-05 PROCEDURE — G0439 PPPS, SUBSEQ VISIT: HCPCS | Performed by: INTERNAL MEDICINE

## 2024-01-05 RX ORDER — LOSARTAN POTASSIUM AND HYDROCHLOROTHIAZIDE 12.5; 5 MG/1; MG/1
1 TABLET ORAL DAILY
Qty: 90 TABLET | Refills: 3 | Status: SHIPPED | OUTPATIENT
Start: 2024-01-05 | End: 2025-01-04

## 2024-01-05 RX ORDER — CLOBETASOL PROPIONATE 0.5 MG/G
CREAM TOPICAL
Qty: 15 G | Refills: 1 | Status: SHIPPED | OUTPATIENT
Start: 2024-01-05

## 2024-01-05 RX ORDER — FLUCONAZOLE 150 MG/1
TABLET ORAL
Qty: 2 TABLET | Refills: 0 | Status: SHIPPED | OUTPATIENT
Start: 2024-01-05

## 2024-01-05 RX ORDER — SCOLOPAMINE TRANSDERMAL SYSTEM 1 MG/1
1 PATCH, EXTENDED RELEASE TRANSDERMAL
Qty: 24 PATCH | Refills: 0 | Status: SHIPPED | OUTPATIENT
Start: 2024-01-05

## 2024-01-05 ASSESSMENT — PATIENT HEALTH QUESTIONNAIRE - PHQ9
SUM OF ALL RESPONSES TO PHQ9 QUESTIONS 1 AND 2: 0
1. LITTLE INTEREST OR PLEASURE IN DOING THINGS: NOT AT ALL
2. FEELING DOWN, DEPRESSED OR HOPELESS: NOT AT ALL

## 2024-01-05 ASSESSMENT — ACTIVITIES OF DAILY LIVING (ADL)
DOING_HOUSEWORK: INDEPENDENT
DRESSING: INDEPENDENT
TAKING_MEDICATION: INDEPENDENT
GROCERY_SHOPPING: INDEPENDENT
BATHING: INDEPENDENT
MANAGING_FINANCES: INDEPENDENT

## 2024-01-05 NOTE — ASSESSMENT & PLAN NOTE
Avoid offending food and avoid eating close to bedtime should wait at least 2 hours.  Continue famotidine.

## 2024-01-05 NOTE — PROGRESS NOTES
"Subjective   Reason for Visit: Yu Oneil is an 68 y.o. female here for a Medicare Wellness visit.     Past Medical, Surgical, and Family History reviewed and updated in chart.         This is a 62-year-old patient is here for [facility] and to f/u on HTN,HLD, hypertension.  She also has been complaining of vaginal itching for 3 to 4 weeks has been worse lately she tried over-the-counter yeast infection cream without any improvement, she is not at risk for STD, no vaginal bleeding or discharge.  He is going on on a cruise and needs scopolamine patch prescription.    she continue to see Dr Awan for her RA.  she rarely needs to use Trazadone for insomnia.  she was a former smoker 1 PPD or more for over 30 years,quit in 2019,last CT chest low dose done 12/17/21.           Patient Care Team:  Nicolette Bowens MD as PCP - General  Nicolette Bowens MD as PCP - [facility] Attributed Provider     Review of Systems   Constitutional: Negative.    HENT: Negative.     Eyes: Negative.    Respiratory: Negative.     Cardiovascular: Negative.    Gastrointestinal: Negative.    Genitourinary: Negative.         Except some vaginal itching.   Neurological: Negative.    Hematological: Negative.    Psychiatric/Behavioral: Negative.         Objective   Vitals:  /76 (BP Location: Left arm, Patient Position: Sitting, BP Cuff Size: Adult)   Pulse 65   Ht 1.695 m (5' 6.75\")   Wt 71.8 kg (158 lb 6.4 oz)   SpO2 98%   BMI 25.00 kg/m²       Physical Exam  Vitals reviewed.   Constitutional:       General: She is not in acute distress.     Appearance: Normal appearance.   HENT:      Head: Normocephalic and atraumatic.      Right Ear: Tympanic membrane and ear canal normal.      Left Ear: Tympanic membrane and ear canal normal.   Eyes:      Extraocular Movements: Extraocular movements intact.      Pupils: Pupils are equal, round, and reactive to light.   Cardiovascular:      Rate and Rhythm: Normal rate and regular rhythm.      Pulses: " Normal pulses.      Heart sounds: Normal heart sounds. No murmur heard.  Pulmonary:      Effort: Pulmonary effort is normal.      Breath sounds: Normal breath sounds.   Abdominal:      General: Abdomen is flat. Bowel sounds are normal.      Palpations: Abdomen is soft.   Genitourinary:     Comments: There is perineal erythema, some self excoriation of the left vulva, pale vulva and pale vaginal mucosa no vaginal discharge.  Musculoskeletal:         General: Normal range of motion.      Cervical back: Normal range of motion and neck supple.      Right lower leg: No edema.      Left lower leg: No edema.   Neurological:      General: No focal deficit present.      Mental Status: She is alert and oriented to person, place, and time.   Psychiatric:         Mood and Affect: Mood normal.         Assessment/Plan   Problem List Items Addressed This Visit       COPD (chronic obstructive pulmonary disease) (CMS/[facility])    GERD (gastroesophageal reflux disease)    Current Assessment & Plan     Avoid offending food and avoid eating close to bedtime should wait at least 2 hours.  Continue famotidine.         Hyperplastic colon polyp    Current Assessment & Plan     Colonoscopy up-to-date she is on 10 years interval.         Osteopenia    Overview     DEXA 7/11: T score -2.4 FN and LS spine         Current Assessment & Plan     Order DEXA scan.  Take calcium 1200 mg daily and vitamin D 1000 international unit daily.  Follow weightbearing exercise.  Avoid falls.         Rheumatoid arthritis (CMS/[facility])    Overview     , CCP greater than 300, ABDIFATAH 1:40         Current Assessment & Plan     Stable managed by Dr. Awan rheumatologist.         Sea sickness    Relevant Medications    scopolamine (Transderm-Scop) 1 mg over 3 days patch 3 day    Vaginal candidiasis    Relevant Medications    fluconazole (Diflucan) 150 mg tablet    Medicare annual wellness visit, subsequent - Primary    Overview     Paronychia          Current Assessment  & Plan     Immunization up-to-date except Shingrix she can have at the pharmacy.         Incomplete RBBB    Current Assessment & Plan     EKG done today.         Emphysema lung (CMS/HCC)    Current Assessment & Plan     Denies any current cough wheezing or sputum production.  Continue steroid inhaler.  Prevnar up-to-date.  Stay off smoking.  Order yearly CT chest screening for 15 years after quitting smoking which was 3 years ago and patient used to smoke 1 pack daily for over 4 years.         Primary hypertension    Current Assessment & Plan     BP not well-controlled, we will add hydrochlorothiazide to current Losartan.  Continue low-sodium diet avoid caffeine intake.  Monitor home BP and see him back in 1 month.         Essential hypertension, benign    Relevant Medications    losartan 50 mg tablet 50 mg, hydroCHLOROthiazide 12.5 mg tablet 12.5 mg    losartan-hydrochlorothiazide (Hyzaar) 50-12.5 mg tablet    Other Relevant Orders    ECG 12 lead (Clinic Performed) (Completed)    CBC (Completed)    Comprehensive Metabolic Panel    TSH with reflex to Free T4 if abnormal (Completed)    Atherosclerosis of aorta (CMS/HCC)    Current Assessment & Plan     Denies any chest pain.  Follow low-fat diet stay off smoking.         Other specified disorders of adrenal gland (CMS/HCC)    Vagina itching    Current Assessment & Plan     Will treat with Diflucan and topical steroid cream.  I did swab to check for bacterial vaginosis and yeast.  Refer to gynecologist specially if her symptoms persist she might need possible vulvar biopsy to rule out lichen sclerosis.         Relevant Medications    clobetasol (Temovate) 0.05 % cream    Other Relevant Orders    Referral to Gynecology    Visit for screening mammogram    Relevant Orders    BI mammo bilateral screening tomosynthesis    Asymptomatic menopause    Relevant Orders    XR DEXA bone density    Encounter for screening for malignant neoplasm of lung in former smoker who quit in  past 15 years with 30 pack year history or greater    Relevant Orders    CT lung screening low dose    Other hyperlipidemia    Relevant Orders    Lipid Panel (Completed)    TSH with reflex to Free T4 if abnormal (Completed)     Other Visit Diagnoses       Routine general medical examination at health care facility        Screen for STD (sexually transmitted disease)        Relevant Orders    Vaginitis Gram Stain For Bacterial Vaginosis + Yeast

## 2024-01-05 NOTE — ASSESSMENT & PLAN NOTE
BP not well-controlled, we will add hydrochlorothiazide to current Losartan.  Continue low-sodium diet avoid caffeine intake.  Monitor home BP and see him back in 1 month.

## 2024-01-05 NOTE — ASSESSMENT & PLAN NOTE
Will treat with Diflucan and topical steroid cream.  I did swab to check for bacterial vaginosis and yeast.  Refer to gynecologist specially if her symptoms persist she might need possible vulvar biopsy to rule out lichen sclerosis.

## 2024-01-05 NOTE — ASSESSMENT & PLAN NOTE
Denies any current cough wheezing or sputum production.  Continue steroid inhaler.  Prevnar up-to-date.  Stay off smoking.  Order yearly CT chest screening for 15 years after quitting smoking which was 3 years ago and patient used to smoke 1 pack daily for over 4 years.

## 2024-01-05 NOTE — PROGRESS NOTES
"Subjective   Patient ID: Yu Oneil is a 68 y.o. female who presents for Medicare Annual Wellness Visit Subsequent and External vaginal itchiness  (Discuss external cream for itchiness.).    HPI     Review of Systems    Objective   /76 (BP Location: Left arm, Patient Position: Sitting, BP Cuff Size: Adult)   Pulse 65   Ht 1.695 m (5' 6.75\")   Wt 71.8 kg (158 lb 6.4 oz)   SpO2 98%   BMI 25.00 kg/m²     Physical Exam    Assessment/Plan   Problem List Items Addressed This Visit             ICD-10-CM    COPD (chronic obstructive pulmonary disease) (CMS/[facility]) J44.9    GERD (gastroesophageal reflux disease) K21.9     Avoid offending food and avoid eating close to bedtime should wait at least 2 hours.  Continue famotidine.         Hyperplastic colon polyp K63.5     Colonoscopy up-to-date she is on 10 years interval.         Osteopenia M85.80     Order DEXA scan.  Take calcium 1200 mg daily and vitamin D 1000 international unit daily.  Follow weightbearing exercise.  Avoid falls.         Rheumatoid arthritis (CMS/Formerly [facility]) M06.9     Stable managed by Dr. Awan rheumatologist.         Sea sickness T75.3XXA    Relevant Medications    scopolamine (Transderm-Scop) 1 mg over 3 days patch 3 day    Vaginal candidiasis B37.31    Relevant Medications    fluconazole (Diflucan) 150 mg tablet    Medicare annual wellness visit, subsequent - Primary Z00.00     Immunization up-to-date except Shingrix she can have at the pharmacy.         Incomplete RBBB I45.10     EKG done today.         Emphysema lung (CMS/[facility]) J43.9     Denies any current cough wheezing or sputum production.  Continue steroid inhaler.  Prevnar up-to-date.  Stay off smoking.  Order yearly CT chest screening for 15 years after quitting smoking which was 3 years ago and patient used to smoke 1 pack daily for over 4 years.         Primary hypertension I10     BP not well-controlled, we will add hydrochlorothiazide to current Losartan.  Continue low-sodium diet " avoid caffeine intake.  Monitor home BP and see him back in 1 month.         Essential hypertension, benign I10    Relevant Medications    losartan 50 mg tablet 50 mg, hydroCHLOROthiazide 12.5 mg tablet 12.5 mg    losartan-hydrochlorothiazide (Hyzaar) 50-12.5 mg tablet    Other Relevant Orders    ECG 12 lead (Clinic Performed) (Completed)    CBC (Completed)    Comprehensive Metabolic Panel    TSH with reflex to Free T4 if abnormal (Completed)    Atherosclerosis of aorta (CMS/HCC) I70.0     Denies any chest pain.  Follow low-fat diet stay off smoking.         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  I did swab to check for bacterial vaginosis and yeast.  Refer to gynecologist specially if her symptoms persist she might need possible vulvar biopsy to rule out lichen sclerosis.         Relevant Medications    clobetasol (Temovate) 0.05 % cream    Other Relevant Orders    Referral to Gynecology    Visit for screening mammogram Z12.31    Relevant Orders    BI mammo bilateral screening tomosynthesis    Asymptomatic menopause Z78.0    Relevant Orders    XR DEXA bone density    Encounter for screening for malignant neoplasm of lung in former smoker who quit in past 15 years with 30 pack year history or greater Z12.2, Z87.891    Relevant Orders    CT lung screening low dose    Other hyperlipidemia E78.49    Relevant Orders    Lipid Panel (Completed)    TSH with reflex to Free T4 if abnormal (Completed)     Other Visit Diagnoses         Codes    Routine general medical examination at health care facility     Z00.00    Screen for STD (sexually transmitted disease)     Z11.3    Relevant Orders    Vaginitis Gram Stain For Bacterial Vaginosis + Yeast

## 2024-01-05 NOTE — ASSESSMENT & PLAN NOTE
Order DEXA scan.  Take calcium 1200 mg daily and vitamin D 1000 international unit daily.  Follow weightbearing exercise.  Avoid falls.

## 2024-01-06 ENCOUNTER — LAB (OUTPATIENT)
Dept: LAB | Facility: LAB | Age: 69
End: 2024-01-06
Payer: MEDICARE

## 2024-01-06 DIAGNOSIS — E78.49 OTHER HYPERLIPIDEMIA: ICD-10-CM

## 2024-01-06 DIAGNOSIS — I10 ESSENTIAL HYPERTENSION, BENIGN: ICD-10-CM

## 2024-01-06 DIAGNOSIS — R25.2 CRAMP IN LOWER LEG: ICD-10-CM

## 2024-01-06 PROBLEM — Z87.891 ENCOUNTER FOR SCREENING FOR MALIGNANT NEOPLASM OF LUNG IN FORMER SMOKER WHO QUIT IN PAST 15 YEARS WITH 30 PACK YEAR HISTORY OR GREATER: Status: ACTIVE | Noted: 2024-01-06

## 2024-01-06 PROBLEM — Z12.31 VISIT FOR SCREENING MAMMOGRAM: Status: ACTIVE | Noted: 2024-01-06

## 2024-01-06 PROBLEM — Z12.2 ENCOUNTER FOR SCREENING FOR MALIGNANT NEOPLASM OF LUNG IN FORMER SMOKER WHO QUIT IN PAST 15 YEARS WITH 30 PACK YEAR HISTORY OR GREATER: Status: ACTIVE | Noted: 2024-01-06

## 2024-01-06 PROBLEM — Z78.0 ASYMPTOMATIC MENOPAUSE: Status: ACTIVE | Noted: 2024-01-06

## 2024-01-06 LAB
ALBUMIN SERPL BCP-MCNC: 4.1 G/DL (ref 3.4–5)
ALP SERPL-CCNC: 70 U/L (ref 33–136)
ALT SERPL W P-5'-P-CCNC: 22 U/L (ref 7–45)
ANION GAP SERPL CALC-SCNC: 10 MMOL/L (ref 10–20)
AST SERPL W P-5'-P-CCNC: 21 U/L (ref 9–39)
BILIRUB SERPL-MCNC: 0.6 MG/DL (ref 0–1.2)
BUN SERPL-MCNC: 17 MG/DL (ref 6–23)
CALCIUM SERPL-MCNC: 9.2 MG/DL (ref 8.6–10.3)
CHLORIDE SERPL-SCNC: 105 MMOL/L (ref 98–107)
CHOLEST SERPL-MCNC: 225 MG/DL (ref 0–199)
CHOLESTEROL/HDL RATIO: 2.4
CO2 SERPL-SCNC: 27 MMOL/L (ref 21–32)
CREAT SERPL-MCNC: 0.91 MG/DL (ref 0.5–1.05)
ERYTHROCYTE [DISTWIDTH] IN BLOOD BY AUTOMATED COUNT: 13.1 % (ref 11.5–14.5)
GFR SERPL CREATININE-BSD FRML MDRD: 69 ML/MIN/1.73M*2
GLUCOSE SERPL-MCNC: 80 MG/DL (ref 74–99)
HCT VFR BLD AUTO: 43.4 % (ref 36–46)
HDLC SERPL-MCNC: 94.4 MG/DL
HGB BLD-MCNC: 14.3 G/DL (ref 12–16)
LDLC SERPL CALC-MCNC: 115 MG/DL
MAGNESIUM SERPL-MCNC: 2.18 MG/DL (ref 1.6–2.4)
MCH RBC QN AUTO: 30.6 PG (ref 26–34)
MCHC RBC AUTO-ENTMCNC: 32.9 G/DL (ref 32–36)
MCV RBC AUTO: 93 FL (ref 80–100)
NON HDL CHOLESTEROL: 131 MG/DL (ref 0–149)
NRBC BLD-RTO: 0 /100 WBCS (ref 0–0)
PLATELET # BLD AUTO: 195 X10*3/UL (ref 150–450)
POTASSIUM SERPL-SCNC: 4.1 MMOL/L (ref 3.5–5.3)
PROT SERPL-MCNC: 7.2 G/DL (ref 6.4–8.2)
RBC # BLD AUTO: 4.68 X10*6/UL (ref 4–5.2)
SODIUM SERPL-SCNC: 138 MMOL/L (ref 136–145)
TRIGL SERPL-MCNC: 77 MG/DL (ref 0–149)
TSH SERPL-ACNC: 1.92 MIU/L (ref 0.44–3.98)
VLDL: 15 MG/DL (ref 0–40)
WBC # BLD AUTO: 5.1 X10*3/UL (ref 4.4–11.3)

## 2024-01-06 PROCEDURE — 80053 COMPREHEN METABOLIC PANEL: CPT

## 2024-01-06 PROCEDURE — 36415 COLL VENOUS BLD VENIPUNCTURE: CPT

## 2024-01-06 PROCEDURE — 83735 ASSAY OF MAGNESIUM: CPT

## 2024-01-06 PROCEDURE — 80061 LIPID PANEL: CPT

## 2024-01-06 PROCEDURE — 85027 COMPLETE CBC AUTOMATED: CPT

## 2024-01-06 PROCEDURE — 84443 ASSAY THYROID STIM HORMONE: CPT

## 2024-01-06 ASSESSMENT — ENCOUNTER SYMPTOMS
NEUROLOGICAL NEGATIVE: 1
GASTROINTESTINAL NEGATIVE: 1
CARDIOVASCULAR NEGATIVE: 1
HEMATOLOGIC/LYMPHATIC NEGATIVE: 1
RESPIRATORY NEGATIVE: 1
CONSTITUTIONAL NEGATIVE: 1
PSYCHIATRIC NEGATIVE: 1
EYES NEGATIVE: 1

## 2024-01-06 NOTE — RESULT ENCOUNTER NOTE
Lab test results show elevated cholesterol, rest of lab result within normal range.  Please follow low-fat diet and regular exercise.

## 2024-01-07 LAB
BACTERIAL VAGINOSIS VAG-IMP: NORMAL
CLUE CELLS VAG LPF-#/AREA: NORMAL /[LPF]
NUGENT SCORE: 6
YEAST VAG WET PREP-#/AREA: NORMAL

## 2024-01-07 NOTE — RESULT ENCOUNTER NOTE
Hi Yu:  Your vaginal swab was negative for yeast or bacterial vaginosis.  Continue using the steroid cream and see the gynecologist as discussed at recent visit.

## 2024-01-08 ENCOUNTER — APPOINTMENT (OUTPATIENT)
Dept: RHEUMATOLOGY | Facility: CLINIC | Age: 69
End: 2024-01-08
Payer: MEDICARE

## 2024-01-10 PROCEDURE — RXMED WILLOW AMBULATORY MEDICATION CHARGE

## 2024-01-11 ENCOUNTER — PHARMACY VISIT (OUTPATIENT)
Dept: PHARMACY | Facility: CLINIC | Age: 69
End: 2024-01-11
Payer: COMMERCIAL

## 2024-01-15 ENCOUNTER — OFFICE VISIT (OUTPATIENT)
Dept: RHEUMATOLOGY | Facility: CLINIC | Age: 69
End: 2024-01-15
Payer: MEDICARE

## 2024-01-15 VITALS
TEMPERATURE: 97.2 F | WEIGHT: 160.4 LBS | OXYGEN SATURATION: 98 % | HEIGHT: 67 IN | BODY MASS INDEX: 25.18 KG/M2 | SYSTOLIC BLOOD PRESSURE: 132 MMHG | HEART RATE: 55 BPM | DIASTOLIC BLOOD PRESSURE: 82 MMHG

## 2024-01-15 DIAGNOSIS — M06.9 RHEUMATOID ARTHRITIS INVOLVING MULTIPLE SITES, UNSPECIFIED WHETHER RHEUMATOID FACTOR PRESENT (MULTI): Primary | ICD-10-CM

## 2024-01-15 PROCEDURE — 99214 OFFICE O/P EST MOD 30 MIN: CPT | Performed by: INTERNAL MEDICINE

## 2024-01-15 PROCEDURE — 3075F SYST BP GE 130 - 139MM HG: CPT | Performed by: INTERNAL MEDICINE

## 2024-01-15 PROCEDURE — 3079F DIAST BP 80-89 MM HG: CPT | Performed by: INTERNAL MEDICINE

## 2024-01-15 PROCEDURE — 1159F MED LIST DOCD IN RCRD: CPT | Performed by: INTERNAL MEDICINE

## 2024-01-15 PROCEDURE — 3008F BODY MASS INDEX DOCD: CPT | Performed by: INTERNAL MEDICINE

## 2024-01-15 PROCEDURE — 1126F AMNT PAIN NOTED NONE PRSNT: CPT | Performed by: INTERNAL MEDICINE

## 2024-01-15 PROCEDURE — 1036F TOBACCO NON-USER: CPT | Performed by: INTERNAL MEDICINE

## 2024-01-15 ASSESSMENT — ENCOUNTER SYMPTOMS
LOSS OF SENSATION IN FEET: 0
DEPRESSION: 0
OCCASIONAL FEELINGS OF UNSTEADINESS: 0

## 2024-01-15 NOTE — PROGRESS NOTES
Subjective   Patient ID: Yu Oneil is a 68 y.o. female who presents for follow up regarding RA..    HPI 68-year-old woman here for follow-up regarding rheumatoid arthritis (, CCP greater than 300) and lumbar spondylosis. She was last seen by me 7/23..     She took methotrexate 2013- 2017, but stopped this due to malaise.     She is on Humira 40 mg SQ every 2 weeks, which overall works well.  She is overall feeling well.  She denies having much joint pain or morning stiffness.    She got insoles for shoes, which has helped with foot pain.  She gets occasional foot cramps.     She was diagnosed with bilateral pneumonia October 2020.  She was then diagnosed with COVID-19 infection November 30, 2020. She had presented with shortness of breath, fatigue, loss of taste and smell. She was hospitalized for 5 days and was treated with remdesivir.     She did resume Humira.     Pulmonary Function tests:  2016 spirometry showed mild airway obstruction with no bronchodilator response. FEV1 of 2.02 L and 73%, FVC of 3.14 L and 88% no restriction TLC of 5.36 L 100%, severe decrease in DLCO of 36%     Pulmonary Function tests:  2016 spirometry showed mild airway obstruction with no bronchodilator response. FEV1 of 2.02 L and 73%, FVC of 3.14 L and 88% no restriction TLC of 5.36 L 100%, severe decrease in DLCO of 36%     PFTs November 2019: No obstruction, mild restriction, severe decrease in DLCO  FEV1 to FVC ratio 86%  FEV1 72% predicted  FVC 35% predicted  TLC 89% predicted  DLCO 62% predicted     CT chest October 2020: Mild biapical scarring. Few blebs in the right upper lobe medial segment, 1 bulla in the right lower lobe which is of similar size since 2018 of 3 cm. New predominantly right lower lobe and some right middle lobe subpleural reticulation with increase in left lower lobe atelectasis versus scarring as compared to the CT chest in December 2019     CT chest November 2020: Biapical scarring. Bilateral lower  "lobe groundglass opacities which worsened since October 2020, more prominent in the right lower lobe especially around bulla/pneumatocele     She quit smoking 2019 (40-pack-year history)     Health maintenance Labs May 2016: BUN 12, creatinine 0.74, CBC normal, liver enzymes normal, TSH 1.2, cholesterol 206, , HDL 84, triglycerides 67.   Labs 3/17: CBC normal, CMP normal, CRP negative.  Labs 11/17: CBC normal, CMP normal, CRP normal.  Labs 12/22: CMP normal except glucose 102, CBC normal  Labs 6/23: Cholesterol 214, HDL 95, , triglycerides 47, hepatic function panel normal  Labs 1/24: TSH 1.92, CBC normal, CMP normal, chol 225, HDL 94, , TG 77     Health maintenance:   prevnar 13 Jan 2021   pneumovax 2/22   Prevnar 20 6/07/23  Moderna COVID vaccine 11/14/23  Flu shot  11/08/23       ROS:  General: Denies fevers or chills.  CV: Denies chest pain or palpitations.  Denies leg edema.  Lungs: Denies coughing or shortness of breath.  Skin: Denies rashes or nodules.  MS: Denies joint pain or joint swelling.     Objective   Visit Vitals  /82 (BP Location: Left arm, Patient Position: Sitting, BP Cuff Size: Small adult)   Pulse 55   Temp 36.2 °C (97.2 °F)   Ht 1.695 m (5' 6.75\")   Wt 72.8 kg (160 lb 6.4 oz)   SpO2 98%   BMI 25.31 kg/m²   Smoking Status Former   BSA 1.85 m²        Physical Exam  Constitutional   General appearance: Alert and in no acute distress.   Eyes   Inspection of eyes: Sclera and conjunctiva were normal.~   Pupil exam: Pupils were equal in size. Extraocular movements were intact.   Pulmonary   Respiratory assessment: No respiratory distress, normal respiratory rhythm and effort.~   Auscultation of Lungs: Clear bilateral breath sounds.   Cardiovascular   Auscultation of heart: Apical pulse normal, heart rate and rhythm normal, normal S1 and S2, no murmurs and no pericardial rub.~   Exam for edema: No peripheral edema.   Abdomen   Abdominal Exam: No bruits, normal bowel sounds, " soft, non-tender, no abdominal mass palpated.~   Liver and Spleen exam: No hepato-splenomegaly.   Neurologic   Cranial nerves: Nerves 2-12 were intact, no focal neuro defects.   Psychiatric   Orientation: Oriented to person, place, and time.~   Mood and affect: Normal.   MS: swollen: 0   tender: 0   patient global: 0   evaluator global: 0   CDAI: 0 (remission)   No vertebral body tenderness.   Has full range of motion both hips without pain.     Skin: No nodules or vasculitis.    Assessment/Plan   Problem List Items Addressed This Visit             ICD-10-CM    Rheumatoid arthritis (CMS/HCC) - Primary M06.9    BMI 25.0-25.9,adult Z68.25         1. Rheumatoid arthritis (seropositive)- currently still in remission by CDAI. (0 swollen, 0 tender joints).      2. COVID-19 pneumonia 11/20-right lower lobe groundglass opacities, as well as left lower lobe atelectasis/scarring.     3. Restrictive lung disease on PFTs with reduced DLCO. Quit smoking about 2019.      4. BMI 25.3- stable.     5. Hypertension - at goal.     7. Hyperlipidemia- good control on Crestor.    Plan:  Recommend Shingrix. It is possible to get sore arm, muscle aches, fatigue, or low grade fever. Symptoms should resolve in 2-3 days. First shot is given at baseline, 2nd shot is 2-6 months later.  Follow-up in 6 months.

## 2024-01-15 NOTE — PATIENT INSTRUCTIONS
Recommend Shingrix. It is possible to get sore arm, muscle aches, fatigue, or low grade fever. Symptoms should resolve in 2-3 days. First shot is given at baseline, 2nd shot is 2-6 months later.  Follow-up in 6 months.

## 2024-01-28 DIAGNOSIS — M06.9 RHEUMATOID ARTHRITIS INVOLVING MULTIPLE SITES, UNSPECIFIED WHETHER RHEUMATOID FACTOR PRESENT (MULTI): Primary | ICD-10-CM

## 2024-01-30 PROCEDURE — RXMED WILLOW AMBULATORY MEDICATION CHARGE

## 2024-02-02 ENCOUNTER — APPOINTMENT (OUTPATIENT)
Dept: PRIMARY CARE | Facility: CLINIC | Age: 69
End: 2024-02-02
Payer: MEDICARE

## 2024-02-05 ENCOUNTER — SPECIALTY PHARMACY (OUTPATIENT)
Dept: PHARMACY | Facility: CLINIC | Age: 69
End: 2024-02-05

## 2024-02-05 ENCOUNTER — PHARMACY VISIT (OUTPATIENT)
Dept: PHARMACY | Facility: CLINIC | Age: 69
End: 2024-02-05
Payer: COMMERCIAL

## 2024-03-07 ENCOUNTER — SPECIALTY PHARMACY (OUTPATIENT)
Dept: PHARMACY | Facility: CLINIC | Age: 69
End: 2024-03-07

## 2024-03-26 PROCEDURE — RXMED WILLOW AMBULATORY MEDICATION CHARGE

## 2024-03-27 ENCOUNTER — PHARMACY VISIT (OUTPATIENT)
Dept: PHARMACY | Facility: CLINIC | Age: 69
End: 2024-03-27
Payer: COMMERCIAL

## 2024-04-17 PROCEDURE — RXMED WILLOW AMBULATORY MEDICATION CHARGE

## 2024-04-18 ENCOUNTER — PHARMACY VISIT (OUTPATIENT)
Dept: PHARMACY | Facility: CLINIC | Age: 69
End: 2024-04-18
Payer: COMMERCIAL

## 2024-04-22 ENCOUNTER — SPECIALTY PHARMACY (OUTPATIENT)
Dept: PHARMACY | Facility: CLINIC | Age: 69
End: 2024-04-22

## 2024-05-15 PROCEDURE — RXMED WILLOW AMBULATORY MEDICATION CHARGE

## 2024-05-17 ENCOUNTER — PHARMACY VISIT (OUTPATIENT)
Dept: PHARMACY | Facility: CLINIC | Age: 69
End: 2024-05-17
Payer: COMMERCIAL

## 2024-05-28 ENCOUNTER — HOSPITAL ENCOUNTER (OUTPATIENT)
Dept: RADIOLOGY | Facility: CLINIC | Age: 69
End: 2024-05-28
Payer: MEDICARE

## 2024-06-12 PROCEDURE — RXMED WILLOW AMBULATORY MEDICATION CHARGE

## 2024-06-13 ENCOUNTER — PHARMACY VISIT (OUTPATIENT)
Dept: PHARMACY | Facility: CLINIC | Age: 69
End: 2024-06-13
Payer: COMMERCIAL

## 2024-06-24 ENCOUNTER — TELEPHONE (OUTPATIENT)
Dept: PRIMARY CARE | Facility: CLINIC | Age: 69
End: 2024-06-24
Payer: MEDICARE

## 2024-06-24 NOTE — TELEPHONE ENCOUNTER
Pt called stated having an issues with her Left eye, upper eyelid, its drooping, red and dry, no discharge occurring for 1 month went to  gave her eye drops and they worked but it came back.    Offered appt today, pt unable to come in today.    Where can I put her later in week?    Please call pt at  [phone]

## 2024-06-25 ENCOUNTER — OFFICE VISIT (OUTPATIENT)
Dept: PRIMARY CARE | Facility: CLINIC | Age: 69
End: 2024-06-25
Payer: MEDICARE

## 2024-06-25 VITALS
HEART RATE: 61 BPM | OXYGEN SATURATION: 98 % | BODY MASS INDEX: 24.68 KG/M2 | DIASTOLIC BLOOD PRESSURE: 93 MMHG | WEIGHT: 156.4 LBS | SYSTOLIC BLOOD PRESSURE: 164 MMHG | TEMPERATURE: 97.6 F

## 2024-06-25 DIAGNOSIS — R53.83 OTHER FATIGUE: ICD-10-CM

## 2024-06-25 DIAGNOSIS — H10.89 OTHER CONJUNCTIVITIS OF LEFT EYE: Primary | ICD-10-CM

## 2024-06-25 PROBLEM — H10.9 CONJUNCTIVITIS OF LEFT EYE: Status: ACTIVE | Noted: 2024-06-25

## 2024-06-25 PROCEDURE — 3008F BODY MASS INDEX DOCD: CPT | Performed by: INTERNAL MEDICINE

## 2024-06-25 PROCEDURE — 99213 OFFICE O/P EST LOW 20 MIN: CPT | Performed by: INTERNAL MEDICINE

## 2024-06-25 PROCEDURE — 1160F RVW MEDS BY RX/DR IN RCRD: CPT | Performed by: INTERNAL MEDICINE

## 2024-06-25 PROCEDURE — 1036F TOBACCO NON-USER: CPT | Performed by: INTERNAL MEDICINE

## 2024-06-25 PROCEDURE — 3080F DIAST BP >= 90 MM HG: CPT | Performed by: INTERNAL MEDICINE

## 2024-06-25 PROCEDURE — 1159F MED LIST DOCD IN RCRD: CPT | Performed by: INTERNAL MEDICINE

## 2024-06-25 PROCEDURE — 3077F SYST BP >= 140 MM HG: CPT | Performed by: INTERNAL MEDICINE

## 2024-06-25 RX ORDER — TOBRAMYCIN AND DEXAMETHASONE 3; 1 MG/G; MG/G
0.5 OINTMENT OPHTHALMIC 3 TIMES DAILY
Qty: 11 G | Refills: 0 | Status: SHIPPED | OUTPATIENT
Start: 2024-06-25 | End: 2024-07-02

## 2024-06-25 RX ORDER — POLYMYXIN B SULFATE AND TRIMETHOPRIM 1; 10000 MG/ML; [USP'U]/ML
SOLUTION OPHTHALMIC
COMMUNITY
Start: 2024-05-18

## 2024-06-25 RX ORDER — CEPHALEXIN 500 MG/1
500 CAPSULE ORAL 2 TIMES DAILY
Qty: 10 CAPSULE | Refills: 0 | Status: SHIPPED | OUTPATIENT
Start: 2024-06-25 | End: 2024-06-30

## 2024-06-25 ASSESSMENT — PATIENT HEALTH QUESTIONNAIRE - PHQ9
SUM OF ALL RESPONSES TO PHQ9 QUESTIONS 1 AND 2: 0
2. FEELING DOWN, DEPRESSED OR HOPELESS: NOT AT ALL
1. LITTLE INTEREST OR PLEASURE IN DOING THINGS: NOT AT ALL

## 2024-06-25 NOTE — PROGRESS NOTES
Subjective   Patient ID: Yu Oneil is a 68 y.o. female who presents for Left eyelid issues (Patient is here for left upper eyelid drooping, redness, and dryness. Patient says that this has been occurring for approximately 1 month. Patient went to  for this issue and was prescribed eye drops which helped but once completed the issue did come back. ) and Fatigue (Patient also complains of fatigue. Patient is unsure as to how long this issue has been occuring, but it has been going on for quite a while now. ).    Patient seen because of left main problem, was seen a month ago at urgent care and was given eyedrops for conjunctivitis improved, has been having flareup off-and-on last few days has been having some crusting when she wakes up in the morning and the upper eyelid is irritated, denies any sore throat or sinus congestion she has her usual cough, she does complain of fatigue         Review of Systems   Eyes:         As HPI.       Objective   BP (!) 164/93 (BP Location: Left arm, Patient Position: Sitting, BP Cuff Size: Adult)   Pulse 61   Temp 36.4 °C (97.6 °F) (Temporal)   Wt 70.9 kg (156 lb 6.4 oz)   SpO2 98%   BMI 24.68 kg/m²     Physical Exam  Constitutional:       Appearance: Normal appearance.   HENT:      Head: Normocephalic and atraumatic.      Right Ear: Tympanic membrane, ear canal and external ear normal.      Left Ear: Tympanic membrane, ear canal and external ear normal.      Nose: Nose normal.      Comments: No sinus tenderness with palpation.  Eyes:      Extraocular Movements: Extraocular movements intact.      Pupils: Pupils are equal, round, and reactive to light.      Comments: Left eye exam reveal minimal swelling and mild redness of upper eyelid, minimal crusting.  Right eye exam is normal   Cardiovascular:      Rate and Rhythm: Normal rate and regular rhythm.      Heart sounds: Normal heart sounds.   Pulmonary:      Effort: Pulmonary effort is normal.      Breath sounds: Normal  breath sounds. No wheezing or rhonchi.   Abdominal:      General: Abdomen is flat. Bowel sounds are normal. There is no distension.      Palpations: Abdomen is soft.   Musculoskeletal:      Cervical back: Normal range of motion and neck supple.      Right lower leg: No edema.      Left lower leg: No edema.   Skin:     General: Skin is warm.   Neurological:      General: No focal deficit present.      Mental Status: She is alert and oriented to person, place, and time.   Psychiatric:         Mood and Affect: Mood normal.         Behavior: Behavior normal.         Assessment/Plan   Problem List Items Addressed This Visit             ICD-10-CM    Fatigue R53.83     Advised patient to see me in 1 month to evaluate her fatigue, she can try over-the-counter vitamin B12 5000 mcg daily.         Conjunctivitis of left eye - Primary H10.9    Relevant Medications    cephalexin (Keflex) 500 mg capsule    tobramycin-dexamethasone (Tobradex) ophthalmic ointment    Other Relevant Orders    Referral to Ophthalmology

## 2024-06-25 NOTE — ASSESSMENT & PLAN NOTE
Advised patient to see me in 1 month to evaluate her fatigue, she can try over-the-counter vitamin B12 5000 mcg daily.

## 2024-06-29 DIAGNOSIS — K21.9 GASTROESOPHAGEAL REFLUX DISEASE, UNSPECIFIED WHETHER ESOPHAGITIS PRESENT: ICD-10-CM

## 2024-07-01 RX ORDER — FAMOTIDINE 20 MG/1
20 TABLET, FILM COATED ORAL 2 TIMES DAILY PRN
Qty: 180 TABLET | Refills: 3 | Status: SHIPPED | OUTPATIENT
Start: 2024-07-01

## 2024-07-05 PROCEDURE — RXMED WILLOW AMBULATORY MEDICATION CHARGE

## 2024-07-09 ENCOUNTER — PHARMACY VISIT (OUTPATIENT)
Dept: PHARMACY | Facility: CLINIC | Age: 69
End: 2024-07-09
Payer: COMMERCIAL

## 2024-07-15 ENCOUNTER — APPOINTMENT (OUTPATIENT)
Dept: RHEUMATOLOGY | Facility: CLINIC | Age: 69
End: 2024-07-15
Payer: MEDICARE

## 2024-07-24 ENCOUNTER — APPOINTMENT (OUTPATIENT)
Dept: RHEUMATOLOGY | Facility: CLINIC | Age: 69
End: 2024-07-24
Payer: MEDICARE

## 2024-07-24 VITALS
WEIGHT: 155.2 LBS | HEART RATE: 63 BPM | SYSTOLIC BLOOD PRESSURE: 134 MMHG | HEIGHT: 67 IN | OXYGEN SATURATION: 96 % | RESPIRATION RATE: 16 BRPM | TEMPERATURE: 97.6 F | BODY MASS INDEX: 24.36 KG/M2 | DIASTOLIC BLOOD PRESSURE: 67 MMHG

## 2024-07-24 DIAGNOSIS — M06.9 RHEUMATOID ARTHRITIS INVOLVING MULTIPLE SITES, UNSPECIFIED WHETHER RHEUMATOID FACTOR PRESENT (MULTI): Primary | ICD-10-CM

## 2024-07-24 PROCEDURE — 1036F TOBACCO NON-USER: CPT | Performed by: INTERNAL MEDICINE

## 2024-07-24 PROCEDURE — 1159F MED LIST DOCD IN RCRD: CPT | Performed by: INTERNAL MEDICINE

## 2024-07-24 PROCEDURE — 1158F ADVNC CARE PLAN TLK DOCD: CPT | Performed by: INTERNAL MEDICINE

## 2024-07-24 PROCEDURE — 3008F BODY MASS INDEX DOCD: CPT | Performed by: INTERNAL MEDICINE

## 2024-07-24 PROCEDURE — 3078F DIAST BP <80 MM HG: CPT | Performed by: INTERNAL MEDICINE

## 2024-07-24 PROCEDURE — 1123F ACP DISCUSS/DSCN MKR DOCD: CPT | Performed by: INTERNAL MEDICINE

## 2024-07-24 PROCEDURE — 99214 OFFICE O/P EST MOD 30 MIN: CPT | Performed by: INTERNAL MEDICINE

## 2024-07-24 PROCEDURE — 3075F SYST BP GE 130 - 139MM HG: CPT | Performed by: INTERNAL MEDICINE

## 2024-07-24 PROCEDURE — 1160F RVW MEDS BY RX/DR IN RCRD: CPT | Performed by: INTERNAL MEDICINE

## 2024-07-24 NOTE — PATIENT INSTRUCTIONS
Try Refresh tears for dry eyes.  If this does not help, would try Pataday eye drops for allergies.  Follow-up in 6 months.

## 2024-07-24 NOTE — PROGRESS NOTES
Subjective   Patient ID: Yu Oneil is a 68 y.o. female who presents for follow up regarding RA..    HPI 68-year-old woman here for follow-up regarding rheumatoid arthritis (, CCP greater than 300) and lumbar spondylosis. She was last seen by me 1/24.     She took methotrexate 2013- 2017, but stopped this due to malaise.     She is on Humira 40 mg SQ every 2 weeks, which overall works well.  She is overall feeling well.  She denies having much joint pain or morning stiffness.    She got insoles for shoes, which has helped with foot pain.  She gets occasional foot cramps.    She complains her left eye sometimes feeling gritty like there is sand in there, and her eye tears.  She was treated once with antibiotics for conjunctivitis, which helped for 3 days but symptoms rapidly recurred.  She has an appoint with her eye doctor January 2025.     She was diagnosed with bilateral pneumonia October 2020.  She was then diagnosed with COVID-19 infection November 30, 2020. She had presented with shortness of breath, fatigue, loss of taste and smell. She was hospitalized for 5 days and was treated with remdesivir.     She did resume Humira.     Pulmonary Function tests:  2016 spirometry showed mild airway obstruction with no bronchodilator response. FEV1 of 2.02 L and 73%, FVC of 3.14 L and 88% no restriction TLC of 5.36 L 100%, severe decrease in DLCO of 36%     PFTs November 2019: No obstruction, mild restriction, severe decrease in DLCO  FEV1 to FVC ratio 86%  FEV1 72% predicted  FVC 35% predicted  TLC 89% predicted  DLCO 62% predicted     CT chest October 2020: Mild biapical scarring. Few blebs in the right upper lobe medial segment, 1 bulla in the right lower lobe which is of similar size since 2018 of 3 cm. New predominantly right lower lobe and some right middle lobe subpleural reticulation with increase in left lower lobe atelectasis versus scarring as compared to the CT chest in December 2019     CT chest  "November 2020: Biapical scarring. Bilateral lower lobe groundglass opacities which worsened since October 2020, more prominent in the right lower lobe especially around bulla/pneumatocele     She quit smoking 2019 (40-pack-year history)     Health maintenance Labs May 2016: BUN 12, creatinine 0.74, CBC normal, liver enzymes normal, TSH 1.2, cholesterol 206, , HDL 84, triglycerides 67.   Labs 3/17: CBC normal, CMP normal, CRP negative.  Labs 11/17: CBC normal, CMP normal, CRP normal.  Labs 12/22: CMP normal except glucose 102, CBC normal  Labs 6/23: Cholesterol 214, HDL 95, , triglycerides 47, hepatic function panel normal  Labs 1/24: TSH 1.92, CBC normal, CMP normal, chol 225, HDL 94, , TG 77   Labs 1/24: TSH  1.92, cholesterol 225, HDL 94, , triglycerides 77, CBC normal, CMP normal , magnesium 2.18    Health maintenance:   prevnar 13 Jan 2021   pneumovax 2/22   Prevnar 20 6/07/23  Moderna COVID vaccine 11/14/23  Flu shot  11/08/23       ROS:  General: Denies fevers or chills.  CV: Denies chest pain or palpitations.  Denies leg edema.  Lungs: Denies coughing or shortness of breath.  Skin: Denies rashes or nodules.  MS: Denies joint pain or joint swelling.     Objective   Visit Vitals  /67 (BP Location: Left arm, Patient Position: Sitting, BP Cuff Size: Adult)   Pulse 63   Temp 36.4 °C (97.6 °F)   Resp 16   Ht 1.695 m (5' 6.75\")   Wt 70.4 kg (155 lb 3.2 oz)   SpO2 96%   BMI 24.49 kg/m²   Smoking Status Former   BSA 1.82 m²        Physical Exam  Constitutional   General appearance: Alert and in no acute distress.   Eyes   Inspection of eyes: Sclera and conjunctiva were normal.~   Pupil exam: Pupils were equal in size. Extraocular movements were intact.   Pulmonary   Respiratory assessment: No respiratory distress, normal respiratory rhythm and effort.~   Auscultation of Lungs: Clear bilateral breath sounds.   Cardiovascular   Auscultation of heart: Apical pulse normal, heart rate and " rhythm normal, normal S1 and S2, no murmurs and no pericardial rub.~   Exam for edema: No peripheral edema.   Abdomen   Abdominal Exam: No bruits, normal bowel sounds, soft, non-tender, no abdominal mass palpated.~   Liver and Spleen exam: No hepato-splenomegaly.   Neurologic   Cranial nerves: Nerves 2-12 were intact, no focal neuro defects.   Psychiatric   Orientation: Oriented to person, place, and time.~   Mood and affect: Normal.   MS: swollen: 0   tender: 0   patient global: 0   evaluator global: 0   CDAI: 0 (remission)   No vertebral body tenderness.   Has full range of motion both hips without pain.     Skin: No nodules or vasculitis.    Assessment/Plan   Problem List Items Addressed This Visit             ICD-10-CM    RA (rheumatoid arthritis) (Multi) - Primary M06.9             1. Rheumatoid arthritis (seropositive)- currently still in remission by CDAI. (0 swollen, 0 tender joints).      2. COVID-19 pneumonia 11/20-right lower lobe groundglass opacities, as well as left lower lobe atelectasis/scarring.     3. Restrictive lung disease on PFTs with reduced DLCO. Quit smoking about 2019.      4. BMI 24- stable.     5. Hypertension - at goal.     7. Hyperlipidemia- good control on Crestor.    8. ACP- Has living will. HPOA is son Kostas.    Plan:  Try Refresh tears for dry eyes.  If this does not help, would try Pataday eye drops for allergies.  Follow-up in 6 months.

## 2024-07-31 ENCOUNTER — APPOINTMENT (OUTPATIENT)
Dept: PRIMARY CARE | Facility: CLINIC | Age: 69
End: 2024-07-31
Payer: MEDICARE

## 2024-07-31 VITALS
TEMPERATURE: 97.4 F | BODY MASS INDEX: 24.36 KG/M2 | SYSTOLIC BLOOD PRESSURE: 136 MMHG | HEART RATE: 62 BPM | OXYGEN SATURATION: 95 % | DIASTOLIC BLOOD PRESSURE: 82 MMHG | WEIGHT: 154.4 LBS

## 2024-07-31 DIAGNOSIS — R26.89 BALANCE PROBLEM: ICD-10-CM

## 2024-07-31 DIAGNOSIS — R42 DIZZINESS: Primary | ICD-10-CM

## 2024-07-31 DIAGNOSIS — R53.83 OTHER FATIGUE: ICD-10-CM

## 2024-07-31 PROCEDURE — 99214 OFFICE O/P EST MOD 30 MIN: CPT | Performed by: INTERNAL MEDICINE

## 2024-07-31 PROCEDURE — 1159F MED LIST DOCD IN RCRD: CPT | Performed by: INTERNAL MEDICINE

## 2024-07-31 PROCEDURE — 3075F SYST BP GE 130 - 139MM HG: CPT | Performed by: INTERNAL MEDICINE

## 2024-07-31 PROCEDURE — 3079F DIAST BP 80-89 MM HG: CPT | Performed by: INTERNAL MEDICINE

## 2024-07-31 PROCEDURE — 1160F RVW MEDS BY RX/DR IN RCRD: CPT | Performed by: INTERNAL MEDICINE

## 2024-07-31 ASSESSMENT — VISUAL ACUITY
OD_CC: 20/25
OS_CC: 20/15

## 2024-07-31 ASSESSMENT — PATIENT HEALTH QUESTIONNAIRE - PHQ9
1. LITTLE INTEREST OR PLEASURE IN DOING THINGS: NOT AT ALL
SUM OF ALL RESPONSES TO PHQ9 QUESTIONS 1 AND 2: 0
2. FEELING DOWN, DEPRESSED OR HOPELESS: NOT AT ALL

## 2024-07-31 NOTE — PROGRESS NOTES
"Subjective   Patient ID: Yu Oneil is a 68 y.o. female who presents for Follow-up (Patient is here for a 1 month follow up. Patient also complains of a left puffy, teary, and red eye. Patient has used OTC Pataday and tried antibiotics in the past for this and it would resolve then come back. Patient thinks she could be allergic to something and would like a referral. ).     f/u on HTN,HLD, hypertension.  Here for follow-up on inflammation of left eye responded initially to TobraDex ointment but then has recurrence of her irritation she has upcoming appointment with ophthalmologist but not until January at her to go see her previous optometrist that she saw in the past outside of .  She also complained of feeling fatigue,\" clumsy\".  Sometimes feel dizzy with changing position.  She tried to stay active and using her bike, she is also involved in the care of her grandkids.  she continue to see Dr Awan for her RA.  she rarely needs to use Trazadone for insomnia.  she was a former smoker 1 PPD or more for over 30 years,quit in 2019,last CT chest low done 12/15/2022  I did order CT chest but she did not schedule it yet.           Review of Systems   Constitutional:         As noted on HPI.   HENT: Negative.     Eyes:         As HPI   Respiratory: Negative.     Endocrine: Negative.    Genitourinary: Negative.    Neurological:         As HPI       Objective   /82 (BP Location: Left arm, Patient Position: Sitting, BP Cuff Size: Adult)   Pulse 62   Temp 36.3 °C (97.4 °F) (Temporal)   Wt 70 kg (154 lb 6.4 oz)   SpO2 95%   BMI 24.36 kg/m²     Physical Exam  Constitutional:       Appearance: Normal appearance.   HENT:      Head: Normocephalic and atraumatic.      Right Ear: Tympanic membrane, ear canal and external ear normal.      Left Ear: Tympanic membrane, ear canal and external ear normal.      Nose: Nose normal.      Comments: No sinus tenderness with palpation.  Eyes:      Extraocular Movements: " Extraocular movements intact.      Pupils: Pupils are equal, round, and reactive to light.      Comments: Left eye exam reveal minimal swelling and mild redness of upper eyelid, minimal crusting.  Right eye exam is normal   Neck:      Vascular: No carotid bruit.   Cardiovascular:      Rate and Rhythm: Normal rate and regular rhythm.      Heart sounds: Normal heart sounds.   Pulmonary:      Effort: Pulmonary effort is normal.      Breath sounds: Normal breath sounds. No wheezing or rhonchi.   Abdominal:      General: Abdomen is flat. Bowel sounds are normal. There is no distension.      Palpations: Abdomen is soft.   Musculoskeletal:      Cervical back: Normal range of motion and neck supple.      Right lower leg: No edema.      Left lower leg: No edema.   Skin:     General: Skin is warm.   Neurological:      General: No focal deficit present.      Mental Status: She is alert and oriented to person, place, and time.      Cranial Nerves: No cranial nerve deficit.      Sensory: No sensory deficit.      Motor: No weakness.      Coordination: Coordination normal.      Gait: Gait normal.      Deep Tendon Reflexes: Reflexes normal.   Psychiatric:         Mood and Affect: Mood normal.         Behavior: Behavior normal.         Assessment/Plan   Problem List Items Addressed This Visit             ICD-10-CM    Fatigue R53.83    Relevant Orders    Vitamin B12    Folate    TSH with reflex to Free T4 if abnormal    Dizziness - Primary R42    Relevant Orders    Vascular US carotid artery duplex bilateral    CT head wo IV contrast    Vitamin B12    Folate    TSH with reflex to Free T4 if abnormal     Other Visit Diagnoses         Codes    Balance problem     R26.89    Relevant Orders    Referral to Physical Therapy

## 2024-08-01 ASSESSMENT — ENCOUNTER SYMPTOMS
RESPIRATORY NEGATIVE: 1
ENDOCRINE NEGATIVE: 1

## 2024-08-02 PROCEDURE — RXMED WILLOW AMBULATORY MEDICATION CHARGE

## 2024-08-05 ENCOUNTER — PHARMACY VISIT (OUTPATIENT)
Dept: PHARMACY | Facility: CLINIC | Age: 69
End: 2024-08-05
Payer: COMMERCIAL

## 2024-08-16 ENCOUNTER — HOSPITAL ENCOUNTER (OUTPATIENT)
Dept: RADIOLOGY | Facility: HOSPITAL | Age: 69
Discharge: HOME | End: 2024-08-16
Payer: MEDICARE

## 2024-08-16 DIAGNOSIS — R42 DIZZINESS: ICD-10-CM

## 2024-08-16 PROCEDURE — 70450 CT HEAD/BRAIN W/O DYE: CPT

## 2024-08-16 PROCEDURE — 70450 CT HEAD/BRAIN W/O DYE: CPT | Performed by: RADIOLOGY

## 2024-08-22 ENCOUNTER — APPOINTMENT (OUTPATIENT)
Dept: CARDIOLOGY | Facility: HOSPITAL | Age: 69
End: 2024-08-22
Payer: MEDICARE

## 2024-08-23 ENCOUNTER — APPOINTMENT (OUTPATIENT)
Dept: RADIOLOGY | Facility: CLINIC | Age: 69
End: 2024-08-23
Payer: MEDICARE

## 2024-08-26 ENCOUNTER — TELEMEDICINE (OUTPATIENT)
Dept: PRIMARY CARE | Facility: CLINIC | Age: 69
End: 2024-08-26
Payer: MEDICARE

## 2024-08-26 ENCOUNTER — TELEPHONE (OUTPATIENT)
Dept: PRIMARY CARE | Facility: CLINIC | Age: 69
End: 2024-08-26

## 2024-08-26 DIAGNOSIS — U07.1 RESPIRATORY TRACT INFECTION DUE TO COVID-19 VIRUS: Primary | ICD-10-CM

## 2024-08-26 DIAGNOSIS — J98.8 RESPIRATORY TRACT INFECTION DUE TO COVID-19 VIRUS: Primary | ICD-10-CM

## 2024-08-26 PROCEDURE — 99213 OFFICE O/P EST LOW 20 MIN: CPT | Performed by: INTERNAL MEDICINE

## 2024-08-26 PROCEDURE — 1160F RVW MEDS BY RX/DR IN RCRD: CPT | Performed by: INTERNAL MEDICINE

## 2024-08-26 PROCEDURE — 1036F TOBACCO NON-USER: CPT | Performed by: INTERNAL MEDICINE

## 2024-08-26 PROCEDURE — 1159F MED LIST DOCD IN RCRD: CPT | Performed by: INTERNAL MEDICINE

## 2024-08-26 ASSESSMENT — ENCOUNTER SYMPTOMS
CARDIOVASCULAR NEGATIVE: 1
COUGH: 1
SHORTNESS OF BREATH: 0
WHEEZING: 0
SINUS PRESSURE: 1
SORE THROAT: 1
FATIGUE: 1
GASTROINTESTINAL NEGATIVE: 1

## 2024-08-26 NOTE — TELEPHONE ENCOUNTER
Patient is positive for COVID, patient would like Paxlovid. Patient is weak, sore throat, cough, congestion, headache. Patient would like a virtual appt. Via phone call.

## 2024-08-26 NOTE — PROGRESS NOTES
Subjective   Patient ID: Yu Oneil is a 68 y.o. female who presents for UTI (Patient tested positive for COVID this morning. Patient's symptoms consists of weakness, sore throat, cough, congestion, and a headache.).    An interactive audio and video telecommunication system with patient real time communications between the patient (at the original site) and provider (at the distant site) was utilized to provide this telehealth service.  Verbal consent was requested and obtained from the patient at this date for a telehealth.   Patient seen virtually because she tested positive for COVID today.  She started feeling fatigued with dry cough, headache, sore throat and congestion she took DayQuil with slight improvement, she denies any shortness of breath or wheezing she has a pulse oximetry and she will monitor her oxygen closely.  She did take her Humira last week her symptoms started early last week.  But her COVID test was negative for the first few days and turn positive as of today.         Review of Systems   Constitutional:  Positive for fatigue.   HENT:  Positive for congestion, sinus pressure and sore throat.    Respiratory:  Positive for cough. Negative for shortness of breath and wheezing.    Cardiovascular: Negative.    Gastrointestinal: Negative.        Objective   There were no vitals taken for this visit.    Physical Exam  Constitutional:       General: She is not in acute distress.  Pulmonary:      Effort: No respiratory distress.   Neurological:      Mental Status: She is alert.         Assessment/Plan   Problem List Items Addressed This Visit             ICD-10-CM    Respiratory tract infection due to COVID-19 virus - Primary U07.1, J98.8     Rest,push fluid,monitor pulse ox closely,go to ER if below 90 % or if short of breath.  Follow CDC recommendation regarding guidelines isolation and recommendation.  Call if not better.  Add Flonase nasal spray for nasal congestion as needed.  Can Tylenol for  pain and fever and Mucinex DM for cough as needed.  I will treat patient with Paxlovid was advised to hold off rosuvastatin for 5 days while on Paxlovid, she is at higher risk due to her age, low immune system, I will also advise her to hold off her Humira until she is completely symptom-free from COVID.         Relevant Medications    nirmatrelvir-ritonavir (PAXLOVID) 300 mg (150 mg x 2)-100 mg tablet therapy pack

## 2024-08-26 NOTE — ASSESSMENT & PLAN NOTE
Rest,push fluid,monitor pulse ox closely,go to ER if below 90 % or if short of breath.  Follow CDC recommendation regarding guidelines isolation and recommendation.  Call if not better.  Add Flonase nasal spray for nasal congestion as needed.  Can Tylenol for pain and fever and Mucinex DM for cough as needed.  I will treat patient with Paxlovid was advised to hold off rosuvastatin for 5 days while on Paxlovid, she is at higher risk due to her age, low immune system, I will also advise her to hold off her Humira until she is completely symptom-free from COVID.

## 2024-08-29 PROCEDURE — RXMED WILLOW AMBULATORY MEDICATION CHARGE

## 2024-08-30 ENCOUNTER — PHARMACY VISIT (OUTPATIENT)
Dept: PHARMACY | Facility: CLINIC | Age: 69
End: 2024-08-30
Payer: COMMERCIAL

## 2024-09-24 PROCEDURE — RXMED WILLOW AMBULATORY MEDICATION CHARGE

## 2024-09-25 ENCOUNTER — PHARMACY VISIT (OUTPATIENT)
Dept: PHARMACY | Facility: CLINIC | Age: 69
End: 2024-09-25
Payer: COMMERCIAL

## 2024-10-21 PROCEDURE — RXMED WILLOW AMBULATORY MEDICATION CHARGE

## 2024-10-22 ENCOUNTER — PHARMACY VISIT (OUTPATIENT)
Dept: PHARMACY | Facility: CLINIC | Age: 69
End: 2024-10-22
Payer: COMMERCIAL

## 2024-10-31 ENCOUNTER — TELEMEDICINE (OUTPATIENT)
Dept: PRIMARY CARE | Facility: CLINIC | Age: 69
End: 2024-10-31
Payer: MEDICARE

## 2024-10-31 ENCOUNTER — TELEPHONE (OUTPATIENT)
Dept: PRIMARY CARE | Facility: CLINIC | Age: 69
End: 2024-10-31

## 2024-10-31 DIAGNOSIS — Z63.4 BEREAVEMENT: Primary | ICD-10-CM

## 2024-10-31 DIAGNOSIS — I10 ESSENTIAL HYPERTENSION, BENIGN: ICD-10-CM

## 2024-10-31 DIAGNOSIS — E78.00 HYPERCHOLESTEROLEMIA: ICD-10-CM

## 2024-10-31 PROCEDURE — G2211 COMPLEX E/M VISIT ADD ON: HCPCS | Performed by: INTERNAL MEDICINE

## 2024-10-31 PROCEDURE — 1159F MED LIST DOCD IN RCRD: CPT | Performed by: INTERNAL MEDICINE

## 2024-10-31 PROCEDURE — 1036F TOBACCO NON-USER: CPT | Performed by: INTERNAL MEDICINE

## 2024-10-31 PROCEDURE — 99213 OFFICE O/P EST LOW 20 MIN: CPT | Performed by: INTERNAL MEDICINE

## 2024-10-31 PROCEDURE — 1160F RVW MEDS BY RX/DR IN RCRD: CPT | Performed by: INTERNAL MEDICINE

## 2024-10-31 RX ORDER — LORAZEPAM 1 MG/1
1 TABLET ORAL 2 TIMES DAILY PRN
Qty: 20 TABLET | Refills: 0 | Status: SHIPPED | OUTPATIENT
Start: 2024-10-31 | End: 2024-11-10

## 2024-10-31 RX ORDER — VENLAFAXINE HYDROCHLORIDE 37.5 MG/1
37.5 CAPSULE, EXTENDED RELEASE ORAL DAILY
Qty: 30 CAPSULE | Refills: 0 | Status: SHIPPED | OUTPATIENT
Start: 2024-10-31 | End: 2024-12-30

## 2024-10-31 SDOH — SOCIAL STABILITY - SOCIAL INSECURITY: DISSAPEARANCE AND DEATH OF FAMILY MEMBER: Z63.4

## 2024-10-31 ASSESSMENT — PATIENT HEALTH QUESTIONNAIRE - PHQ9
3. TROUBLE FALLING OR STAYING ASLEEP OR SLEEPING TOO MUCH: NEARLY EVERY DAY
8. MOVING OR SPEAKING SO SLOWLY THAT OTHER PEOPLE COULD HAVE NOTICED. OR THE OPPOSITE, BEING SO FIGETY OR RESTLESS THAT YOU HAVE BEEN MOVING AROUND A LOT MORE THAN USUAL: NOT AT ALL
7. TROUBLE CONCENTRATING ON THINGS, SUCH AS READING THE NEWSPAPER OR WATCHING TELEVISION: NEARLY EVERY DAY
5. POOR APPETITE OR OVEREATING: NEARLY EVERY DAY
4. FEELING TIRED OR HAVING LITTLE ENERGY: NEARLY EVERY DAY
9. THOUGHTS THAT YOU WOULD BE BETTER OFF DEAD, OR OF HURTING YOURSELF: NOT AT ALL
6. FEELING BAD ABOUT YOURSELF - OR THAT YOU ARE A FAILURE OR HAVE LET YOURSELF OR YOUR FAMILY DOWN: NOT AT ALL
2. FEELING DOWN, DEPRESSED OR HOPELESS: NEARLY EVERY DAY
SUM OF ALL RESPONSES TO PHQ QUESTIONS 1-9: 18
1. LITTLE INTEREST OR PLEASURE IN DOING THINGS: NEARLY EVERY DAY
10. IF YOU CHECKED OFF ANY PROBLEMS, HOW DIFFICULT HAVE THESE PROBLEMS MADE IT FOR YOU TO DO YOUR WORK, TAKE CARE OF THINGS AT HOME, OR GET ALONG WITH OTHER PEOPLE: EXTREMELY DIFFICULT
SUM OF ALL RESPONSES TO PHQ9 QUESTIONS 1 AND 2: 6

## 2024-11-05 ENCOUNTER — APPOINTMENT (OUTPATIENT)
Dept: OPHTHALMOLOGY | Facility: CLINIC | Age: 69
End: 2024-11-05
Payer: MEDICARE

## 2024-11-06 ENCOUNTER — APPOINTMENT (OUTPATIENT)
Dept: PRIMARY CARE | Facility: CLINIC | Age: 69
End: 2024-11-06
Payer: MEDICARE

## 2024-11-06 VITALS
TEMPERATURE: 97.5 F | HEART RATE: 68 BPM | BODY MASS INDEX: 24.24 KG/M2 | SYSTOLIC BLOOD PRESSURE: 158 MMHG | OXYGEN SATURATION: 95 % | DIASTOLIC BLOOD PRESSURE: 84 MMHG | WEIGHT: 153.6 LBS

## 2024-11-06 DIAGNOSIS — F33.9 DEPRESSION, RECURRENT (CMS-HCC): Primary | ICD-10-CM

## 2024-11-06 DIAGNOSIS — E78.00 HYPERCHOLESTEROLEMIA: ICD-10-CM

## 2024-11-06 DIAGNOSIS — I10 ESSENTIAL HYPERTENSION, BENIGN: ICD-10-CM

## 2024-11-06 DIAGNOSIS — Z63.4 BEREAVEMENT: ICD-10-CM

## 2024-11-06 DIAGNOSIS — Z23 NEED FOR INFLUENZA VACCINATION: ICD-10-CM

## 2024-11-06 DIAGNOSIS — Z79.899 MEDICATION MANAGEMENT: ICD-10-CM

## 2024-11-06 PROCEDURE — 90662 IIV NO PRSV INCREASED AG IM: CPT | Performed by: INTERNAL MEDICINE

## 2024-11-06 PROCEDURE — 99214 OFFICE O/P EST MOD 30 MIN: CPT | Performed by: INTERNAL MEDICINE

## 2024-11-06 PROCEDURE — 3079F DIAST BP 80-89 MM HG: CPT | Performed by: INTERNAL MEDICINE

## 2024-11-06 PROCEDURE — 1159F MED LIST DOCD IN RCRD: CPT | Performed by: INTERNAL MEDICINE

## 2024-11-06 PROCEDURE — 3077F SYST BP >= 140 MM HG: CPT | Performed by: INTERNAL MEDICINE

## 2024-11-06 PROCEDURE — G0008 ADMIN INFLUENZA VIRUS VAC: HCPCS | Performed by: INTERNAL MEDICINE

## 2024-11-06 PROCEDURE — G2211 COMPLEX E/M VISIT ADD ON: HCPCS | Performed by: INTERNAL MEDICINE

## 2024-11-06 PROCEDURE — 1160F RVW MEDS BY RX/DR IN RCRD: CPT | Performed by: INTERNAL MEDICINE

## 2024-11-06 RX ORDER — VENLAFAXINE HYDROCHLORIDE 75 MG/1
75 CAPSULE, EXTENDED RELEASE ORAL DAILY
Qty: 90 CAPSULE | Refills: 3 | Status: SHIPPED | OUTPATIENT
Start: 2024-11-06 | End: 2025-11-06

## 2024-11-06 RX ORDER — LORAZEPAM 1 MG/1
1 TABLET ORAL 2 TIMES DAILY PRN
Qty: 60 TABLET | Refills: 0 | Status: SHIPPED | OUTPATIENT
Start: 2024-11-06

## 2024-11-06 SDOH — SOCIAL STABILITY - SOCIAL INSECURITY: DISSAPEARANCE AND DEATH OF FAMILY MEMBER: Z63.4

## 2024-11-06 ASSESSMENT — PATIENT HEALTH QUESTIONNAIRE - PHQ9
SUM OF ALL RESPONSES TO PHQ9 QUESTIONS 1 AND 2: 5
10. IF YOU CHECKED OFF ANY PROBLEMS, HOW DIFFICULT HAVE THESE PROBLEMS MADE IT FOR YOU TO DO YOUR WORK, TAKE CARE OF THINGS AT HOME, OR GET ALONG WITH OTHER PEOPLE: SOMEWHAT DIFFICULT
5. POOR APPETITE OR OVEREATING: MORE THAN HALF THE DAYS
7. TROUBLE CONCENTRATING ON THINGS, SUCH AS READING THE NEWSPAPER OR WATCHING TELEVISION: SEVERAL DAYS
3. TROUBLE FALLING OR STAYING ASLEEP OR SLEEPING TOO MUCH: MORE THAN HALF THE DAYS
SUM OF ALL RESPONSES TO PHQ QUESTIONS 1-9: 12
9. THOUGHTS THAT YOU WOULD BE BETTER OFF DEAD, OR OF HURTING YOURSELF: NOT AT ALL
8. MOVING OR SPEAKING SO SLOWLY THAT OTHER PEOPLE COULD HAVE NOTICED. OR THE OPPOSITE, BEING SO FIGETY OR RESTLESS THAT YOU HAVE BEEN MOVING AROUND A LOT MORE THAN USUAL: NOT AT ALL
4. FEELING TIRED OR HAVING LITTLE ENERGY: MORE THAN HALF THE DAYS
1. LITTLE INTEREST OR PLEASURE IN DOING THINGS: MORE THAN HALF THE DAYS
6. FEELING BAD ABOUT YOURSELF - OR THAT YOU ARE A FAILURE OR HAVE LET YOURSELF OR YOUR FAMILY DOWN: NOT AT ALL
2. FEELING DOWN, DEPRESSED OR HOPELESS: NEARLY EVERY DAY

## 2024-11-06 NOTE — PROGRESS NOTES
"Subjective   Patient ID: Yu Oneil is a 69 y.o. female who presents for Follow-up (Patient is here for a follow up on son's passing. ).    Pt seen for follow up, she lost her son earlier last month on October 9 ,2024,she has been feeling very sad, crying all the time, unable to relax, her friend have been calling her but\" she does not want to talk to anyone and does not want to relive the event\", she is dealing with it on her own has been having very difficult time with that and grieving ,she talk to her ex (the father of her lost son),but he was admitted with some cardiac problems,I did start her on Effexor and Ativan,helping her sleep.  In past she was on Zoloft but caused a rash.  No CP or palpitation.  She declined seeing counselor of grief support group.         Review of Systems   Constitutional: Negative.    HENT: Negative.     Eyes: Negative.    Respiratory: Negative.     Cardiovascular: Negative.    Gastrointestinal: Negative.    Genitourinary: Negative.    Neurological: Negative.    Hematological: Negative.    Psychiatric/Behavioral:          As HPI.       Objective   /84 (BP Location: Left arm, Patient Position: Sitting, BP Cuff Size: Adult)   Pulse 68   Temp 36.4 °C (97.5 °F) (Temporal)   Wt 69.7 kg (153 lb 9.6 oz)   SpO2 95%   BMI 24.24 kg/m²     Physical Exam  Constitutional:       Appearance: Normal appearance.   HENT:      Head: Normocephalic and atraumatic.   Eyes:      Extraocular Movements: Extraocular movements intact.      Pupils: Pupils are equal, round, and reactive to light.   Cardiovascular:      Rate and Rhythm: Normal rate and regular rhythm.      Heart sounds: Normal heart sounds.   Pulmonary:      Effort: Pulmonary effort is normal.      Breath sounds: Normal breath sounds. No wheezing or rhonchi.   Musculoskeletal:         General: Normal range of motion.      Cervical back: Normal range of motion and neck supple.      Right lower leg: No edema.      Left lower " leg: No edema.   Skin:     General: Skin is warm.   Neurological:      General: No focal deficit present.      Mental Status: She is alert and oriented to person, place, and time.   Psychiatric:      Comments: Tearful.         Assessment/Plan   Problem List Items Addressed This Visit             ICD-10-CM    Hypercholesterolemia E78.00     Continue Crestor and low-fat diet.         Essential hypertension, benign I10     Continue same med.         Bereavement Z63.4    Relevant Medications    venlafaxine XR (Effexor-XR) 75 mg 24 hr capsule    LORazepam (Ativan) 1 mg tablet    Depression, recurrent (CMS-HCC) - Primary F33.9     Increase dose of  Effexor.  Renew Ativan,advised to start tapering down her Ativan and to change to 1/2 tab instead of a full one,in AM and can take Trazadone in PM.  CS agreement  signed today and urine drug screen ordered today.  Emotional support given to pt.  Follow up in 1 month.         Medication management Z79.899    Relevant Orders    DRUG SCREEN,URINE    Need for influenza vaccination Z23    Relevant Orders    Flu vaccine, trivalent, preservative free, HIGH-DOSE, age 65y+ (Fluzone) (Completed)

## 2024-11-10 PROBLEM — Z23 NEED FOR INFLUENZA VACCINATION: Status: ACTIVE | Noted: 2024-11-10

## 2024-11-10 PROBLEM — Z79.899 MEDICATION MANAGEMENT: Status: ACTIVE | Noted: 2024-11-10

## 2024-11-10 PROBLEM — F33.9 DEPRESSION, RECURRENT (CMS-HCC): Status: ACTIVE | Noted: 2024-11-10

## 2024-11-10 ASSESSMENT — ENCOUNTER SYMPTOMS
CARDIOVASCULAR NEGATIVE: 1
CONSTITUTIONAL NEGATIVE: 1
EYES NEGATIVE: 1
RESPIRATORY NEGATIVE: 1
NEUROLOGICAL NEGATIVE: 1
HEMATOLOGIC/LYMPHATIC NEGATIVE: 1
GASTROINTESTINAL NEGATIVE: 1

## 2024-11-11 NOTE — ASSESSMENT & PLAN NOTE
Increase dose of  Effexor.  Renew Ativan,advised to start tapering down her Ativan and to change to 1/2 tab instead of a full one,in AM and can take Trazadone in PM.  CS agreement  signed today and urine drug screen ordered today.  Emotional support given to pt.  Follow up in 1 month.

## 2024-11-15 PROCEDURE — RXMED WILLOW AMBULATORY MEDICATION CHARGE

## 2024-11-16 ENCOUNTER — PHARMACY VISIT (OUTPATIENT)
Dept: PHARMACY | Facility: CLINIC | Age: 69
End: 2024-11-16
Payer: COMMERCIAL

## 2024-11-25 ENCOUNTER — APPOINTMENT (OUTPATIENT)
Dept: PRIMARY CARE | Facility: CLINIC | Age: 69
End: 2024-11-25
Payer: MEDICARE

## 2024-12-11 PROCEDURE — RXMED WILLOW AMBULATORY MEDICATION CHARGE

## 2024-12-13 ENCOUNTER — PHARMACY VISIT (OUTPATIENT)
Dept: PHARMACY | Facility: CLINIC | Age: 69
End: 2024-12-13
Payer: COMMERCIAL

## 2025-01-07 DIAGNOSIS — M06.9 RHEUMATOID ARTHRITIS INVOLVING MULTIPLE SITES, UNSPECIFIED WHETHER RHEUMATOID FACTOR PRESENT (MULTI): ICD-10-CM

## 2025-01-07 RX ORDER — ADALIMUMAB 40MG/0.4ML
40 KIT SUBCUTANEOUS
Qty: 6 EACH | Refills: 1 | Status: SHIPPED | OUTPATIENT
Start: 2025-01-07

## 2025-01-07 NOTE — TELEPHONE ENCOUNTER
Patient is requesting a change to current Rx: adalimumab (Humira Pen) 40 mg/0.4 mL pen injector kit pen-injector

## 2025-01-08 ENCOUNTER — SPECIALTY PHARMACY (OUTPATIENT)
Dept: PHARMACY | Facility: CLINIC | Age: 70
End: 2025-01-08

## 2025-01-14 ENCOUNTER — TELEPHONE (OUTPATIENT)
Dept: PRIMARY CARE | Facility: CLINIC | Age: 70
End: 2025-01-14
Payer: MEDICARE

## 2025-01-14 NOTE — TELEPHONE ENCOUNTER
NOAM Victor MD  Called and left voicemail for patient informing her of this.      NOAM Leigh          Previous Messages       ----- Message -----  From: Silvia Rivera MD  Sent: 1/7/2025   1:01 PM EST  To: Lu Hargrove MA    Please call. I renewed Humira. I recommend scheduling follow up appointment (last seen 7/24).  Dr. Awan

## 2025-01-15 ENCOUNTER — SPECIALTY PHARMACY (OUTPATIENT)
Dept: PHARMACY | Facility: CLINIC | Age: 70
End: 2025-01-15

## 2025-01-16 ENCOUNTER — SPECIALTY PHARMACY (OUTPATIENT)
Dept: PHARMACY | Facility: CLINIC | Age: 70
End: 2025-01-16

## 2025-01-22 ENCOUNTER — TELEPHONE (OUTPATIENT)
Dept: PRIMARY CARE | Facility: CLINIC | Age: 70
End: 2025-01-22
Payer: MEDICARE

## 2025-01-22 NOTE — TELEPHONE ENCOUNTER
Jayashree Villareal, PharmD  Silvia Rivera MD; [facility]; [facility]  Replies will be sent to [facility]  It looks like its approved on [employer]side but with $2000 copay. So they are going to work with pt to get in the medicare copay support program so that she would pay $167 a month to insurance instead of max 2K / year all up front.          Previous Messages       ----- Message -----  From: Silvia Rivera MD  Sent: 1/21/2025   5:01 PM EST  To: [facility]    Hello. Do we know if her Humira was approved? It sounds like she is being told she needs to change to biosimilar drug (she received letter that I have not seen).  Dr. Awan

## 2025-01-23 ENCOUNTER — APPOINTMENT (OUTPATIENT)
Dept: OPHTHALMOLOGY | Facility: CLINIC | Age: 70
End: 2025-01-23
Payer: MEDICARE

## 2025-01-23 ENCOUNTER — OFFICE VISIT (OUTPATIENT)
Dept: OPHTHALMOLOGY | Facility: CLINIC | Age: 70
End: 2025-01-23
Payer: MEDICARE

## 2025-01-23 DIAGNOSIS — H10.403 CHRONIC BACTERIAL CONJUNCTIVITIS OF BOTH EYES: ICD-10-CM

## 2025-01-23 PROCEDURE — 99203 OFFICE O/P NEW LOW 30 MIN: CPT | Performed by: OPTOMETRIST

## 2025-01-23 ASSESSMENT — ENCOUNTER SYMPTOMS
ALLERGIC/IMMUNOLOGIC NEGATIVE: 0
HEMATOLOGIC/LYMPHATIC NEGATIVE: 0
NEUROLOGICAL NEGATIVE: 0
MUSCULOSKELETAL NEGATIVE: 0
CARDIOVASCULAR NEGATIVE: 0
CONSTITUTIONAL NEGATIVE: 0
PSYCHIATRIC NEGATIVE: 0
ENDOCRINE NEGATIVE: 0
RESPIRATORY NEGATIVE: 0
GASTROINTESTINAL NEGATIVE: 0
EYES NEGATIVE: 1

## 2025-01-23 ASSESSMENT — VISUAL ACUITY
OS_CC: 20/20
OD_CC+: -2
OD_CC: 20/30
METHOD: SNELLEN - LINEAR
OD_PH_SC: 20/25

## 2025-01-23 ASSESSMENT — TONOMETRY
IOP_METHOD: GOLDMANN APPLANATION
OS_IOP_MMHG: 17
OD_IOP_MMHG: 17

## 2025-01-23 ASSESSMENT — EXTERNAL EXAM - RIGHT EYE: OD_EXAM: NORMAL

## 2025-01-23 ASSESSMENT — EXTERNAL EXAM - LEFT EYE: OS_EXAM: NORMAL

## 2025-01-23 ASSESSMENT — CUP TO DISC RATIO
OS_RATIO: 0.25
OD_RATIO: 0.25

## 2025-01-23 NOTE — PROGRESS NOTES
Assessment/Plan   Diagnoses and all orders for this visit:  Other conjunctivitis of left eye  -New pt, flare back in August 2024 and called central office to schedule, they gave her a 5month wait so she was not seen.   -Flare in June 2024: PCP gave 5day pulse of Keflex and Tobradex jono/gel OS TID x 7days. Pt used Gel during most recent flare up to assist in healing.   -Today appears normal and healthy. No cells/flare, injeciton, or elevated/lowered pressures to cause concern.   -Possible relation to chronic immunosuppression in setting of RA.   -Given phone number for Triage and advised immediate examination at next flare up.    RTC for comprehensive eye examination at earliest convenient, or sooner if symptoms recur.

## 2025-01-24 ENCOUNTER — APPOINTMENT (OUTPATIENT)
Dept: RHEUMATOLOGY | Facility: CLINIC | Age: 70
End: 2025-01-24
Payer: MEDICARE

## 2025-01-27 ENCOUNTER — SPECIALTY PHARMACY (OUTPATIENT)
Dept: PHARMACY | Facility: CLINIC | Age: 70
End: 2025-01-27

## 2025-02-07 ENCOUNTER — SPECIALTY PHARMACY (OUTPATIENT)
Dept: PHARMACY | Facility: CLINIC | Age: 70
End: 2025-02-07

## 2025-02-08 ENCOUNTER — SPECIALTY PHARMACY (OUTPATIENT)
Dept: PHARMACY | Facility: CLINIC | Age: 70
End: 2025-02-08

## 2025-02-17 ENCOUNTER — SPECIALTY PHARMACY (OUTPATIENT)
Dept: PHARMACY | Facility: CLINIC | Age: 70
End: 2025-02-17

## 2025-02-25 ENCOUNTER — SPECIALTY PHARMACY (OUTPATIENT)
Dept: PHARMACY | Facility: CLINIC | Age: 70
End: 2025-02-25

## 2025-03-03 ENCOUNTER — APPOINTMENT (OUTPATIENT)
Dept: PRIMARY CARE | Facility: CLINIC | Age: 70
End: 2025-03-03
Payer: MEDICARE

## 2025-03-03 VITALS
DIASTOLIC BLOOD PRESSURE: 98 MMHG | OXYGEN SATURATION: 97 % | SYSTOLIC BLOOD PRESSURE: 148 MMHG | HEIGHT: 67 IN | BODY MASS INDEX: 23.07 KG/M2 | TEMPERATURE: 97.9 F | WEIGHT: 147 LBS | HEART RATE: 64 BPM

## 2025-03-03 DIAGNOSIS — K21.9 GASTROESOPHAGEAL REFLUX DISEASE, UNSPECIFIED WHETHER ESOPHAGITIS PRESENT: ICD-10-CM

## 2025-03-03 DIAGNOSIS — G47.09 OTHER INSOMNIA: ICD-10-CM

## 2025-03-03 DIAGNOSIS — M06.9 RHEUMATOID ARTHRITIS, INVOLVING UNSPECIFIED SITE, UNSPECIFIED WHETHER RHEUMATOID FACTOR PRESENT (MULTI): ICD-10-CM

## 2025-03-03 DIAGNOSIS — E78.49 OTHER HYPERLIPIDEMIA: ICD-10-CM

## 2025-03-03 DIAGNOSIS — Z00.00 MEDICARE ANNUAL WELLNESS VISIT, SUBSEQUENT: Primary | ICD-10-CM

## 2025-03-03 DIAGNOSIS — Z00.00 ROUTINE GENERAL MEDICAL EXAMINATION AT HEALTH CARE FACILITY: ICD-10-CM

## 2025-03-03 DIAGNOSIS — Z78.0 ASYMPTOMATIC MENOPAUSE: ICD-10-CM

## 2025-03-03 DIAGNOSIS — Z87.891 ENCOUNTER FOR SCREENING FOR MALIGNANT NEOPLASM OF LUNG IN FORMER SMOKER WHO QUIT IN PAST 15 YEARS WITH 30 PACK YEAR HISTORY OR GREATER: ICD-10-CM

## 2025-03-03 DIAGNOSIS — I10 PRIMARY HYPERTENSION: ICD-10-CM

## 2025-03-03 DIAGNOSIS — K62.5 RECTAL BLEED: ICD-10-CM

## 2025-03-03 DIAGNOSIS — I10 ESSENTIAL HYPERTENSION, BENIGN: ICD-10-CM

## 2025-03-03 DIAGNOSIS — M85.80 OSTEOPENIA, UNSPECIFIED LOCATION: ICD-10-CM

## 2025-03-03 DIAGNOSIS — Z12.2 ENCOUNTER FOR SCREENING FOR MALIGNANT NEOPLASM OF LUNG IN FORMER SMOKER WHO QUIT IN PAST 15 YEARS WITH 30 PACK YEAR HISTORY OR GREATER: ICD-10-CM

## 2025-03-03 DIAGNOSIS — Z63.4 BEREAVEMENT: ICD-10-CM

## 2025-03-03 DIAGNOSIS — Z12.31 VISIT FOR SCREENING MAMMOGRAM: ICD-10-CM

## 2025-03-03 DIAGNOSIS — J43.9 PULMONARY EMPHYSEMA, UNSPECIFIED EMPHYSEMA TYPE (MULTI): ICD-10-CM

## 2025-03-03 DIAGNOSIS — J20.9 ACUTE BRONCHITIS, UNSPECIFIED ORGANISM: ICD-10-CM

## 2025-03-03 PROBLEM — R05.3 CHRONIC COUGH: Status: ACTIVE | Noted: 2025-03-03

## 2025-03-03 PROCEDURE — G0439 PPPS, SUBSEQ VISIT: HCPCS | Performed by: INTERNAL MEDICINE

## 2025-03-03 PROCEDURE — 99215 OFFICE O/P EST HI 40 MIN: CPT | Performed by: INTERNAL MEDICINE

## 2025-03-03 PROCEDURE — 1170F FXNL STATUS ASSESSED: CPT | Performed by: INTERNAL MEDICINE

## 2025-03-03 PROCEDURE — 3008F BODY MASS INDEX DOCD: CPT | Performed by: INTERNAL MEDICINE

## 2025-03-03 PROCEDURE — 93000 ELECTROCARDIOGRAM COMPLETE: CPT | Performed by: INTERNAL MEDICINE

## 2025-03-03 PROCEDURE — 3077F SYST BP >= 140 MM HG: CPT | Performed by: INTERNAL MEDICINE

## 2025-03-03 PROCEDURE — 3080F DIAST BP >= 90 MM HG: CPT | Performed by: INTERNAL MEDICINE

## 2025-03-03 PROCEDURE — G0446 INTENS BEHAVE THER CARDIO DX: HCPCS | Performed by: INTERNAL MEDICINE

## 2025-03-03 PROCEDURE — G0296 VISIT TO DETERM LDCT ELIG: HCPCS | Performed by: INTERNAL MEDICINE

## 2025-03-03 PROCEDURE — 1159F MED LIST DOCD IN RCRD: CPT | Performed by: INTERNAL MEDICINE

## 2025-03-03 PROCEDURE — 1160F RVW MEDS BY RX/DR IN RCRD: CPT | Performed by: INTERNAL MEDICINE

## 2025-03-03 RX ORDER — DOXYCYCLINE 100 MG/1
100 CAPSULE ORAL 2 TIMES DAILY
Qty: 14 CAPSULE | Refills: 0 | Status: SHIPPED | OUTPATIENT
Start: 2025-03-03 | End: 2025-03-10

## 2025-03-03 RX ORDER — TRAZODONE HYDROCHLORIDE 50 MG/1
50 TABLET ORAL NIGHTLY PRN
Qty: 90 TABLET | Refills: 0 | Status: SHIPPED | OUTPATIENT
Start: 2025-03-03

## 2025-03-03 RX ORDER — LOSARTAN POTASSIUM 25 MG/1
25 TABLET ORAL DAILY
Qty: 30 TABLET | Refills: 11 | Status: SHIPPED | OUTPATIENT
Start: 2025-03-03 | End: 2026-03-03

## 2025-03-03 RX ORDER — ROSUVASTATIN CALCIUM 10 MG/1
10 TABLET, COATED ORAL DAILY
Start: 2025-03-03 | End: 2026-03-03

## 2025-03-03 RX ORDER — BENZONATATE 200 MG/1
200 CAPSULE ORAL 3 TIMES DAILY PRN
Qty: 42 CAPSULE | Refills: 0 | Status: SHIPPED | OUTPATIENT
Start: 2025-03-03 | End: 2025-04-02

## 2025-03-03 RX ORDER — METHYLPREDNISOLONE 4 MG/1
TABLET ORAL
Qty: 21 TABLET | Refills: 0 | Status: SHIPPED | OUTPATIENT
Start: 2025-03-03 | End: 2025-03-09

## 2025-03-03 RX ORDER — OMEPRAZOLE 40 MG/1
40 CAPSULE, DELAYED RELEASE ORAL
Qty: 90 CAPSULE | Refills: 0 | Status: SHIPPED | OUTPATIENT
Start: 2025-03-03 | End: 2025-06-01

## 2025-03-03 SDOH — SOCIAL STABILITY - SOCIAL INSECURITY: DISSAPEARANCE AND DEATH OF FAMILY MEMBER: Z63.4

## 2025-03-03 ASSESSMENT — ACTIVITIES OF DAILY LIVING (ADL)
TAKING_MEDICATION: INDEPENDENT
GROCERY_SHOPPING: INDEPENDENT
MANAGING_FINANCES: INDEPENDENT
BATHING: INDEPENDENT
DOING_HOUSEWORK: INDEPENDENT
DRESSING: INDEPENDENT

## 2025-03-03 NOTE — ASSESSMENT & PLAN NOTE
Continue Trazadone  Orders:    traZODone (Desyrel) 50 mg tablet; Take 1 tablet (50 mg) by mouth as needed at bedtime for sleep.

## 2025-03-03 NOTE — PROGRESS NOTES
"Subjective   Reason for Visit: Yu Oneil is an 69 y.o. female here for a Medicare Wellness visit.     Past Medical, Surgical, and Family History reviewed and updated in chart.    Reviewed all medications by prescribing practitioner or clinical pharmacist (such as prescriptions, OTCs, herbal therapies and supplements) and documented in the medical record.    This is a 62-year-old patient is here for [facility] and to f/u on HTN,HLD, hypertension.  She has several concerns:    She has GERD and had 1 episode of BRBPR off and on for several years,she had colonoscopy in 2022,found to have hemorrhoids,Bx were also taken at that time and were normal mucosa,no abnormalities,no abdominal pain or weight loss.  She lost her son,but keeping busy,working 2 days a week and baby sitting her grand son,she is no longer on Ativan,she never started Effexor.  She has not been taking her BP,cholesterol med or Effexor,she went on a cruise..  she continue to see Dr Awan for her RA.  she needs refill on Trazadone for insomnia.  she is  a former smoker 1 PPD or more for over 30 years,quit in 2019,last CT chest low dose done 12/17/21.   She has cough productive or yellowish sputum,no loss of smell or taste,no myalgia,no SOB,no fever or chills     BI MAMMOGRAM: 12/2/2019  COLONOSCOPY: 3/25/2022  BONE DENSITY: 12/17/2021         Patient Care Team:  Nicolette Bowens MD as PCP - General  Nicolette Bowens MD [facility] PCP [facility]     Review of Systems  As reported on HPI,otherwise negative    Objective   Vitals:  BP (!) 148/98 (BP Location: Left arm, Patient Position: Sitting)   Pulse 64   Temp 36.6 °C (97.9 °F) (Temporal)   Ht 1.695 m (5' 6.75\")   Wt 66.7 kg (147 lb)   SpO2 97%   BMI 23.20 kg/m²       Physical Exam  Vitals reviewed.   Constitutional:       General: She is not in acute distress.     Appearance: Normal appearance.   HENT:      Head: Normocephalic and atraumatic.      Right Ear: Tympanic membrane, ear canal " and external ear normal.      Left Ear: Tympanic membrane, ear canal and external ear normal.      Mouth/Throat:      Mouth: Mucous membranes are moist.      Pharynx: No oropharyngeal exudate or posterior oropharyngeal erythema.   Eyes:      General: No scleral icterus.     Extraocular Movements: Extraocular movements intact.      Conjunctiva/sclera: Conjunctivae normal.      Pupils: Pupils are equal, round, and reactive to light.   Neck:      Vascular: No carotid bruit.   Cardiovascular:      Rate and Rhythm: Normal rate and regular rhythm.      Pulses: Normal pulses.      Heart sounds: Normal heart sounds. No murmur heard.  Pulmonary:      Effort: Pulmonary effort is normal.      Breath sounds: Normal breath sounds.   Abdominal:      General: Abdomen is flat. Bowel sounds are normal.      Palpations: Abdomen is soft.      Tenderness: There is no right CVA tenderness or left CVA tenderness.   Musculoskeletal:      Cervical back: Normal range of motion and neck supple.      Right lower leg: No edema.      Left lower leg: No edema.   Lymphadenopathy:      Cervical: No cervical adenopathy.   Neurological:      General: No focal deficit present.      Mental Status: She is alert and oriented to person, place, and time.   Psychiatric:         Mood and Affect: Mood normal.         Assessment & Plan  Essential hypertension, benign    Orders:    ECG 12 lead (Clinic Performed)    losartan (Cozaar) 25 mg tablet; Take 1 tablet (25 mg) by mouth once daily.    Routine general medical examination at health care facility    Orders:    1 Year Follow Up In [facility] - PCP - Wellness Exam; Future    Other insomnia  Continue Trazadone  Orders:    traZODone (Desyrel) 50 mg tablet; Take 1 tablet (50 mg) by mouth as needed at bedtime for sleep.    Rectal bleed  Could be due to hemorrhoids?,she had colonoscopy in 2022,advised to add Benefiber or Metamucil,reassess in 1 month,plan to refer to GI  Orders:    CBC; Future    Iron  and TIBC; Future    Primary hypertension  Resume Losartan,follow up in 4-6 weeks,follow low sodium diet.  Orders:    CBC; Future    Comprehensive Metabolic Panel; Future    Other hyperlipidemia  Resume Statin  I spent 15 minutes discussing her cardiovascular risk and behavior therapies of nutritional choices,exercise, and elimination of habits contributing to risk. we agreed on a plan how they may be able to reduce their current cardiovascular risk. For patient with risk calculation > 10 %, Aspirin use was discussed and encouraged unless known allergy or increased risk of bleeding contraindicated use. Patient 10 year CV risk estimate calculates:  12.1 %.  Orders:    Lipid Panel; Future    TSH with reflex to Free T4 if abnormal; Future    rosuvastatin (Crestor) 10 mg tablet; Take 1 tablet (10 mg) by mouth once daily.    Medicare annual wellness visit, subsequent  I spent 10 minutes discussing need for lung CT for screening,stay off smoking.       Rheumatoid arthritis, involving unspecified site, unspecified whether rheumatoid factor present (Multi)  Managed by [facility],on Humira         Pulmonary emphysema, unspecified emphysema type (Multi)  Will refer to pulmonologist.    Orders:    Referral to Pulmonology; Future    Gastroesophageal reflux disease, unspecified whether esophagitis present    Orders:    omeprazole (PriLOSEC) 40 mg DR capsule; Take 1 capsule (40 mg) by mouth once daily in the morning. Take before meals. Do not crush or chew.    Encounter for screening for malignant neoplasm of lung in former smoker who quit in past 15 years with 30 pack year history or greater    Orders:    CT lung screening low dose; Future    Visit for screening mammogram    Orders:    BI mammo bilateral screening tomosynthesis; Future    Asymptomatic menopause    Orders:    XR DEXA bone density; Future    Acute bronchitis, unspecified organism    Orders:    doxycycline (Vibramycin) 100 mg capsule; Take 1 capsule (100 mg) by mouth 2  times a day for 7 days. Take with at least 8 ounces (large glass) of water, do not lie down for 30 minutes after    methylPREDNISolone (Medrol Dospak) 4 mg tablets; Take as directed on package.    benzonatate (Tessalon) 200 mg capsule; Take 1 capsule (200 mg) by mouth 3 times a day as needed for cough. Do not crush or chew.    Osteopenia, unspecified location  Order DEXA,take calcium 1200 mg daily and vitamin D3  1000 intern units daily         Bereavement  Emotional support was provided to patient.  She declined any counseling for now.

## 2025-03-03 NOTE — ASSESSMENT & PLAN NOTE
Could be due to hemorrhoids?,she had colonoscopy in 2022,advised to add Benefiber or Metamucil,reassess in 1 month,plan to refer to GI  Orders:    CBC; Future    Iron and TIBC; Future

## 2025-03-03 NOTE — ASSESSMENT & PLAN NOTE
Orders:    ECG 12 lead (Clinic Performed)    losartan (Cozaar) 25 mg tablet; Take 1 tablet (25 mg) by mouth once daily.

## 2025-03-03 NOTE — ASSESSMENT & PLAN NOTE
Resume Statin  I spent 15 minutes discussing her cardiovascular risk and behavior therapies of nutritional choices,exercise, and elimination of habits contributing to risk. we agreed on a plan how they may be able to reduce their current cardiovascular risk. For patient with risk calculation > 10 %, Aspirin use was discussed and encouraged unless known allergy or increased risk of bleeding contraindicated use. Patient 10 year CV risk estimate calculates:  12.1 %.  Orders:    Lipid Panel; Future    TSH with reflex to Free T4 if abnormal; Future    rosuvastatin (Crestor) 10 mg tablet; Take 1 tablet (10 mg) by mouth once daily.

## 2025-03-03 NOTE — ASSESSMENT & PLAN NOTE
Resume Losartan,follow up in 4-6 weeks,follow low sodium diet.  Orders:    CBC; Future    Comprehensive Metabolic Panel; Future

## 2025-03-04 DIAGNOSIS — J42 CHRONIC BRONCHITIS, UNSPECIFIED CHRONIC BRONCHITIS TYPE (MULTI): ICD-10-CM

## 2025-03-04 RX ORDER — ALBUTEROL SULFATE 90 UG/1
1 INHALANT RESPIRATORY (INHALATION) EVERY 4 HOURS PRN
Qty: 54 G | Refills: 3 | Status: SHIPPED | OUTPATIENT
Start: 2025-03-04

## 2025-03-05 ENCOUNTER — SPECIALTY PHARMACY (OUTPATIENT)
Dept: PHARMACY | Facility: CLINIC | Age: 70
End: 2025-03-05

## 2025-03-08 LAB
ALBUMIN SERPL-MCNC: 3.9 G/DL (ref 3.6–5.1)
ALP SERPL-CCNC: 66 U/L (ref 37–153)
ALT SERPL-CCNC: 17 U/L (ref 6–29)
ANION GAP SERPL CALCULATED.4IONS-SCNC: 7 MMOL/L (CALC) (ref 7–17)
AST SERPL-CCNC: 16 U/L (ref 10–35)
BILIRUB SERPL-MCNC: 0.5 MG/DL (ref 0.2–1.2)
BUN SERPL-MCNC: 22 MG/DL (ref 7–25)
CALCIUM SERPL-MCNC: 9.1 MG/DL (ref 8.6–10.4)
CHLORIDE SERPL-SCNC: 105 MMOL/L (ref 98–110)
CHOLEST SERPL-MCNC: 225 MG/DL
CHOLEST/HDLC SERPL: 2.5 (CALC)
CO2 SERPL-SCNC: 27 MMOL/L (ref 20–32)
CREAT SERPL-MCNC: 0.75 MG/DL (ref 0.5–1.05)
EGFRCR SERPLBLD CKD-EPI 2021: 87 ML/MIN/1.73M2
ERYTHROCYTE [DISTWIDTH] IN BLOOD BY AUTOMATED COUNT: 13 % (ref 11–15)
GLUCOSE SERPL-MCNC: 89 MG/DL (ref 65–99)
HCT VFR BLD AUTO: 44.6 % (ref 35–45)
HDLC SERPL-MCNC: 89 MG/DL
HGB BLD-MCNC: 14.5 G/DL (ref 11.7–15.5)
IRON SATN MFR SERPL: 27 % (CALC) (ref 16–45)
IRON SERPL-MCNC: 82 MCG/DL (ref 45–160)
LDLC SERPL CALC-MCNC: 121 MG/DL (CALC)
MCH RBC QN AUTO: 29.8 PG (ref 27–33)
MCHC RBC AUTO-ENTMCNC: 32.5 G/DL (ref 32–36)
MCV RBC AUTO: 91.8 FL (ref 80–100)
NONHDLC SERPL-MCNC: 136 MG/DL (CALC)
PLATELET # BLD AUTO: 253 THOUSAND/UL (ref 140–400)
PMV BLD REES-ECKER: 9.2 FL (ref 7.5–12.5)
POTASSIUM SERPL-SCNC: 4.2 MMOL/L (ref 3.5–5.3)
PROT SERPL-MCNC: 7 G/DL (ref 6.1–8.1)
RBC # BLD AUTO: 4.86 MILLION/UL (ref 3.8–5.1)
SODIUM SERPL-SCNC: 139 MMOL/L (ref 135–146)
TIBC SERPL-MCNC: 309 MCG/DL (CALC) (ref 250–450)
TRIGL SERPL-MCNC: 54 MG/DL
TSH SERPL-ACNC: 1.66 MIU/L (ref 0.4–4.5)
WBC # BLD AUTO: 6.2 THOUSAND/UL (ref 3.8–10.8)

## 2025-03-10 ENCOUNTER — SPECIALTY PHARMACY (OUTPATIENT)
Dept: RHEUMATOLOGY | Facility: CLINIC | Age: 70
End: 2025-03-10
Payer: MEDICARE

## 2025-03-26 ENCOUNTER — PHARMACY VISIT (OUTPATIENT)
Dept: PHARMACY | Facility: CLINIC | Age: 70
End: 2025-03-26
Payer: COMMERCIAL

## 2025-03-26 PROCEDURE — RXMED WILLOW AMBULATORY MEDICATION CHARGE

## 2025-04-03 ENCOUNTER — APPOINTMENT (OUTPATIENT)
Dept: RADIOLOGY | Facility: CLINIC | Age: 70
End: 2025-04-03
Payer: MEDICARE

## 2025-04-03 ENCOUNTER — HOSPITAL ENCOUNTER (OUTPATIENT)
Dept: RADIOLOGY | Facility: CLINIC | Age: 70
End: 2025-04-03
Payer: MEDICARE

## 2025-04-08 ENCOUNTER — SPECIALTY PHARMACY (OUTPATIENT)
Dept: PHARMACY | Facility: CLINIC | Age: 70
End: 2025-04-08

## 2025-04-16 ENCOUNTER — APPOINTMENT (OUTPATIENT)
Dept: RADIOLOGY | Facility: CLINIC | Age: 70
End: 2025-04-16
Payer: MEDICARE

## 2025-04-21 ENCOUNTER — APPOINTMENT (OUTPATIENT)
Dept: PRIMARY CARE | Facility: CLINIC | Age: 70
End: 2025-04-21
Payer: MEDICARE

## 2025-04-25 ENCOUNTER — APPOINTMENT (OUTPATIENT)
Dept: RADIOLOGY | Facility: CLINIC | Age: 70
End: 2025-04-25
Payer: MEDICARE

## 2025-04-26 ENCOUNTER — APPOINTMENT (OUTPATIENT)
Dept: RADIOLOGY | Facility: CLINIC | Age: 70
End: 2025-04-26
Payer: MEDICARE

## 2025-04-28 ENCOUNTER — APPOINTMENT (OUTPATIENT)
Dept: RADIOLOGY | Facility: CLINIC | Age: 70
End: 2025-04-28
Payer: MEDICARE

## 2025-05-09 ENCOUNTER — APPOINTMENT (OUTPATIENT)
Dept: RADIOLOGY | Facility: CLINIC | Age: 70
End: 2025-05-09
Payer: MEDICARE

## 2025-05-10 ENCOUNTER — APPOINTMENT (OUTPATIENT)
Dept: RADIOLOGY | Facility: CLINIC | Age: 70
End: 2025-05-10
Payer: MEDICARE

## 2025-05-15 ENCOUNTER — APPOINTMENT (OUTPATIENT)
Dept: RADIOLOGY | Facility: CLINIC | Age: 70
End: 2025-05-15
Payer: MEDICARE

## 2025-05-22 ENCOUNTER — APPOINTMENT (OUTPATIENT)
Dept: RADIOLOGY | Facility: CLINIC | Age: 70
End: 2025-05-22
Payer: MEDICARE

## 2025-05-24 ENCOUNTER — APPOINTMENT (OUTPATIENT)
Dept: RADIOLOGY | Facility: CLINIC | Age: 70
End: 2025-05-24
Payer: MEDICARE

## 2025-05-29 ENCOUNTER — APPOINTMENT (OUTPATIENT)
Dept: RADIOLOGY | Facility: HOSPITAL | Age: 70
End: 2025-05-29
Payer: MEDICARE

## 2025-05-29 DIAGNOSIS — G47.09 OTHER INSOMNIA: ICD-10-CM

## 2025-05-29 DIAGNOSIS — K21.9 GASTROESOPHAGEAL REFLUX DISEASE, UNSPECIFIED WHETHER ESOPHAGITIS PRESENT: ICD-10-CM

## 2025-05-29 RX ORDER — TRAZODONE HYDROCHLORIDE 50 MG/1
50 TABLET ORAL NIGHTLY PRN
Qty: 90 TABLET | Refills: 0 | Status: SHIPPED | OUTPATIENT
Start: 2025-05-29

## 2025-05-29 RX ORDER — OMEPRAZOLE 40 MG/1
40 CAPSULE, DELAYED RELEASE ORAL
Qty: 90 CAPSULE | Refills: 0 | Status: SHIPPED | OUTPATIENT
Start: 2025-05-29 | End: 2025-08-27

## 2025-06-02 ENCOUNTER — APPOINTMENT (OUTPATIENT)
Dept: RADIOLOGY | Facility: HOSPITAL | Age: 70
End: 2025-06-02
Payer: MEDICARE

## 2025-06-02 ENCOUNTER — APPOINTMENT (OUTPATIENT)
Dept: RADIOLOGY | Facility: CLINIC | Age: 70
End: 2025-06-02
Payer: MEDICARE

## 2025-06-11 PROCEDURE — RXMED WILLOW AMBULATORY MEDICATION CHARGE

## 2025-06-12 ENCOUNTER — PHARMACY VISIT (OUTPATIENT)
Dept: PHARMACY | Facility: CLINIC | Age: 70
End: 2025-06-12
Payer: COMMERCIAL

## 2025-06-13 ENCOUNTER — SPECIALTY PHARMACY (OUTPATIENT)
Dept: PHARMACY | Facility: CLINIC | Age: 70
End: 2025-06-13

## 2025-06-21 ENCOUNTER — APPOINTMENT (OUTPATIENT)
Dept: RADIOLOGY | Facility: CLINIC | Age: 70
End: 2025-06-21
Payer: MEDICARE

## 2025-06-23 ENCOUNTER — APPOINTMENT (OUTPATIENT)
Dept: PRIMARY CARE | Facility: CLINIC | Age: 70
End: 2025-06-23
Payer: MEDICARE

## 2025-08-08 ENCOUNTER — TELEPHONE (OUTPATIENT)
Dept: PRIMARY CARE | Facility: CLINIC | Age: 70
End: 2025-08-08
Payer: MEDICARE

## 2025-08-13 ENCOUNTER — TELEPHONE (OUTPATIENT)
Dept: PRIMARY CARE | Facility: CLINIC | Age: 70
End: 2025-08-13
Payer: MEDICARE

## 2025-08-22 DIAGNOSIS — K21.9 GASTROESOPHAGEAL REFLUX DISEASE, UNSPECIFIED WHETHER ESOPHAGITIS PRESENT: ICD-10-CM

## 2025-08-22 DIAGNOSIS — G47.09 OTHER INSOMNIA: ICD-10-CM

## 2025-08-22 RX ORDER — OMEPRAZOLE 40 MG/1
40 CAPSULE, DELAYED RELEASE ORAL
Qty: 90 CAPSULE | Refills: 0 | Status: SHIPPED | OUTPATIENT
Start: 2025-08-22 | End: 2026-08-17

## 2025-08-22 RX ORDER — TRAZODONE HYDROCHLORIDE 50 MG/1
50 TABLET ORAL NIGHTLY PRN
Qty: 90 TABLET | Refills: 0 | Status: SHIPPED | OUTPATIENT
Start: 2025-08-22 | End: 2026-08-17

## 2025-09-03 ENCOUNTER — TELEPHONE (OUTPATIENT)
Dept: PRIMARY CARE | Facility: CLINIC | Age: 70
End: 2025-09-03
Payer: MEDICARE

## 2025-09-03 DIAGNOSIS — M06.9 RHEUMATOID ARTHRITIS INVOLVING MULTIPLE SITES, UNSPECIFIED WHETHER RHEUMATOID FACTOR PRESENT (MULTI): ICD-10-CM

## 2025-09-03 RX ORDER — ADALIMUMAB 40MG/0.4ML
40 KIT SUBCUTANEOUS
Qty: 6 EACH | Refills: 1 | Status: SHIPPED | OUTPATIENT
Start: 2025-09-03

## 2025-09-04 ENCOUNTER — SPECIALTY PHARMACY (OUTPATIENT)
Dept: PHARMACY | Facility: CLINIC | Age: 70
End: 2025-09-04

## 2026-01-28 ENCOUNTER — APPOINTMENT (OUTPATIENT)
Dept: OPHTHALMOLOGY | Facility: CLINIC | Age: 71
End: 2026-01-28
Payer: MEDICARE